# Patient Record
Sex: FEMALE | Race: WHITE | NOT HISPANIC OR LATINO | Employment: FULL TIME | URBAN - METROPOLITAN AREA
[De-identification: names, ages, dates, MRNs, and addresses within clinical notes are randomized per-mention and may not be internally consistent; named-entity substitution may affect disease eponyms.]

---

## 2019-03-15 ENCOUNTER — APPOINTMENT (OUTPATIENT)
Dept: RADIOLOGY | Facility: CLINIC | Age: 49
End: 2019-03-15
Payer: COMMERCIAL

## 2019-03-15 ENCOUNTER — OFFICE VISIT (OUTPATIENT)
Dept: URGENT CARE | Facility: CLINIC | Age: 49
End: 2019-03-15
Payer: COMMERCIAL

## 2019-03-15 VITALS
SYSTOLIC BLOOD PRESSURE: 124 MMHG | DIASTOLIC BLOOD PRESSURE: 67 MMHG | RESPIRATION RATE: 18 BRPM | BODY MASS INDEX: 28.99 KG/M2 | HEIGHT: 65 IN | HEART RATE: 89 BPM | WEIGHT: 174 LBS | OXYGEN SATURATION: 96 % | TEMPERATURE: 98.6 F

## 2019-03-15 DIAGNOSIS — M25.521 RIGHT ELBOW PAIN: Primary | ICD-10-CM

## 2019-03-15 DIAGNOSIS — M25.521 RIGHT ELBOW PAIN: ICD-10-CM

## 2019-03-15 PROCEDURE — 73080 X-RAY EXAM OF ELBOW: CPT

## 2019-03-15 PROCEDURE — 99203 OFFICE O/P NEW LOW 30 MIN: CPT | Performed by: PHYSICIAN ASSISTANT

## 2019-03-15 PROCEDURE — 99283 EMERGENCY DEPT VISIT LOW MDM: CPT | Performed by: PHYSICIAN ASSISTANT

## 2019-03-15 PROCEDURE — G0382 LEV 3 HOSP TYPE B ED VISIT: HCPCS | Performed by: PHYSICIAN ASSISTANT

## 2019-03-15 NOTE — PROGRESS NOTES
[facility] Now        NAME: Brittney Pizarro is a 50 y o  female  : 1970    MRN: [id number]  DATE: March 15, 2019  TIME: 4:52 PM    Assessment and Plan   Right elbow pain [M25 521]  1  Right elbow pain  XR elbow 3+ vw right    Ambulatory referral to Physical Therapy         Patient Instructions     No fracture on xray  Take ibuprofen 600 mg every 6 hours for inflammation  Take with food  Ice to the elbow for 20-30 minute 3-4 times daily  Follow up with physical therapy and/or orthopedics for persistent symptoms  Follow up with PCP in 3-5 days  Proceed to  ER if symptoms worsen  Chief Complaint     Chief Complaint   Patient presents with    Elbow Pain     PT complains of having right elbow pain x 1 mnth         History of Present Illness       This is a 50year old female presenting for right elbow pain x 1 month  She reports that she had 2 consecutive falls about a month ago and landed on her elbow both times  She had some intermittent pain but over the last month the pain has been gradually worsening and the pain in the elbow is now constant  It mostly radiates down toward the hand and sometimes to the shoulder  She denies any shooting pains down into the hand  She denies any numbness or tingling to the hand though she sometimes has weakness due to pain  No fevers, chills  She has been taking an aspirin every now and then but otherwise no treatments for this yet  She is right hand dominant  She does work with her hands and has been using the arm frequently  No previous history of problems with this arm  Review of Systems   Review of Systems   Constitutional: Negative for fever  Musculoskeletal: Positive for arthralgias  Negative for back pain, gait problem and joint swelling  Skin: Negative for wound  Neurological: Positive for weakness  Negative for numbness  Current Medications     No current outpatient medications on file      Current Allergies     Allergies as of 03/15/2019 - Reviewed 03/15/2019   Allergen Reaction Noted    Dilaudid [hydromorphone]  03/15/2019            The following portions of the patient's history were reviewed and updated as appropriate: allergies, current medications, past family history, past medical history, past social history, past surgical history and problem list      History reviewed  No pertinent past medical history  History reviewed  No pertinent surgical history  History reviewed  No pertinent family history  Medications have been verified  Objective   /67 (BP Location: Right arm, Patient Position: Sitting, Cuff Size: Standard)   Pulse 89   Temp 98 6 °F (37 °C) (Tympanic)   Resp 18   Ht 5' 5" (1 651 m)   Wt 78 9 kg (174 lb)   SpO2 96%   BMI 28 96 kg/m²        Physical Exam     Physical Exam   Constitutional: She appears well-developed and well-nourished  No distress  HENT:   Nose: Nose normal    Cardiovascular: Normal rate, regular rhythm and normal heart sounds  Pulmonary/Chest: Effort normal and breath sounds normal  No respiratory distress  Musculoskeletal:   RUE: There is no tenderness to the shoulder or humerus  There is TTP to the lateral epicondyle with mild tenderness to the surrounding area  Mild TTP to the forearm  No TTP to the wrist or hands  2+ radial and ulnar pulses bilaterally  Sensation intact throughout  FAROM of the elbow  5/5 strength BL UE and LE  Negative cubital tunnel test    Neurological: She is alert  Skin: Skin is warm and dry  She is not diaphoretic  Nursing note and vitals reviewed

## 2019-03-15 NOTE — PATIENT INSTRUCTIONS
No fracture on xray  Take ibuprofen 600 mg every 6 hours for inflammation  Take with food  Ice to the elbow for 20-30 minute 3-4 times daily  Follow up with physical therapy and/or orthopedics for persistent symptoms  Go to the ER for any distress

## 2020-09-05 ENCOUNTER — OFFICE VISIT (OUTPATIENT)
Dept: URGENT CARE | Facility: CLINIC | Age: 50
End: 2020-09-05
Payer: COMMERCIAL

## 2020-09-05 VITALS
HEIGHT: 65 IN | HEART RATE: 94 BPM | DIASTOLIC BLOOD PRESSURE: 88 MMHG | SYSTOLIC BLOOD PRESSURE: 140 MMHG | OXYGEN SATURATION: 98 % | WEIGHT: 170 LBS | RESPIRATION RATE: 18 BRPM | TEMPERATURE: 97.7 F | BODY MASS INDEX: 28.32 KG/M2

## 2020-09-05 DIAGNOSIS — M62.838 NECK MUSCLE SPASM: Primary | ICD-10-CM

## 2020-09-05 PROCEDURE — 99283 EMERGENCY DEPT VISIT LOW MDM: CPT | Performed by: PHYSICIAN ASSISTANT

## 2020-09-05 PROCEDURE — G0382 LEV 3 HOSP TYPE B ED VISIT: HCPCS | Performed by: PHYSICIAN ASSISTANT

## 2020-09-05 PROCEDURE — 99203 OFFICE O/P NEW LOW 30 MIN: CPT | Performed by: PHYSICIAN ASSISTANT

## 2020-09-05 RX ORDER — PREDNISONE 10 MG/1
TABLET ORAL
Qty: 18 TABLET | Refills: 0 | Status: SHIPPED | OUTPATIENT
Start: 2020-09-05

## 2020-09-05 RX ORDER — METHOCARBAMOL 500 MG/1
500 TABLET, FILM COATED ORAL 4 TIMES DAILY
Qty: 40 TABLET | Refills: 0 | Status: SHIPPED | OUTPATIENT
Start: 2020-09-05

## 2020-09-05 NOTE — PROGRESS NOTES
[facility] Now        NAME: Shola Crowley is a 48 y o  female  : 1970    MRN: [id number]  DATE: 2020  TIME: 9:51 AM    Assessment and Plan   Neck muscle spasm [M62 838]  1  Neck muscle spasm  predniSONE 10 mg tablet    methocarbamol (ROBAXIN) 500 mg tablet    Ambulatory referral to Physical Therapy         Patient Instructions     Patient Instructions     Continue moist heat  Will start muscle relaxers and prednisone  Follow-up with physical therapy  If worsening pain, numbness or tingling follow-up with PCP  Follow up with PCP in 3-5 days  Proceed to  ER if symptoms worsen  Chief Complaint     Chief Complaint   Patient presents with    Shoulder Pain     Pt c/o right shoulder pain that radiates up side of neck and down right arm x 1 week, no injury  History of Present Illness         55-year-old female presents to the clinic with right-sided neck pain and shoulder pain for last week  No fall or trauma  She has pain radiating down her neck to her upper arm  No pain in the hand or wrist   No numbness or tingling  She has used heat at home  No history of previous neck problems  Review of Systems   Review of Systems   Constitutional: Negative for chills, fatigue and fever  HENT: Negative for congestion, ear pain, postnasal drip, rhinorrhea, sinus pressure, sinus pain, sore throat and trouble swallowing  Eyes: Negative for visual disturbance  Respiratory: Negative for chest tightness and shortness of breath  Cardiovascular: Negative for chest pain and palpitations  Gastrointestinal: Negative for diarrhea, nausea and vomiting  Musculoskeletal: Positive for arthralgias and neck pain  Negative for joint swelling  Neurological: Negative for dizziness           Current Medications       Current Outpatient Medications:     methocarbamol (ROBAXIN) 500 mg tablet, Take 1 tablet (500 mg total) by mouth 4 (four) times a day, Disp: 40 tablet, Rfl: 0   predniSONE 10 mg tablet, 3 tabs daily for 3 days, 2 tabs daily for 3 days, 1 tab daily for 3 days, Disp: 18 tablet, Rfl: 0    Current Allergies     Allergies as of 09/05/2020 - Reviewed 09/05/2020   Allergen Reaction Noted    Dilaudid [hydromorphone]  03/15/2019            The following portions of the patient's history were reviewed and updated as appropriate: allergies, current medications, past family history, past medical history, past social history, past surgical history and problem list      History reviewed  No pertinent past medical history  Past Surgical History:   Procedure Laterality Date    GALLBLADDER SURGERY      GASTRIC BYPASS  2012       History reviewed  No pertinent family history  Medications have been verified  Objective   /88 (BP Location: Left arm, Patient Position: Sitting, Cuff Size: Standard)   Pulse 94   Temp 97 7 °F (36 5 °C) (Tympanic)   Resp 18   Ht 5' 5" (1 651 m)   Wt 77 1 kg (170 lb)   SpO2 98%   BMI 28 29 kg/m²        Physical Exam     Physical Exam  Constitutional:       General: She is not in acute distress  Appearance: She is well-developed  Musculoskeletal:      Comments:   Cervical spine nontender  Right trapezius and neck muscles mildly tender with spasm  Neck range of motion maintained with flexion  She has decreased range of motion with extension  Pain with extension  She has pain with left rotation  No pain with right rotation  Bilateral upper extremity strength 5/5  Right shoulder full range of motion no weakness  Negative drop arm  Negative supraspinatus testing  Negative impingement  She does have pain with internal rotation at 90° of abduction  This causes pain in the trapezius and the periscapular area  Mildly tender on the medial scapular border  Skin:     Comments:   Left posterior elbow with small 1 cm rubbery mobile nodule  Likely cystic structure in the skin  No overlying skin changes  No erythema    No pustule or drainage  Neurological:      Mental Status: She is alert and oriented to person, place, and time

## 2020-09-05 NOTE — PATIENT INSTRUCTIONS
Continue moist heat  Will start muscle relaxers and prednisone  Follow-up with physical therapy  If worsening pain, numbness or tingling follow-up with PCP

## 2022-10-12 ENCOUNTER — OFFICE VISIT (OUTPATIENT)
Dept: URGENT CARE | Facility: CLINIC | Age: 52
End: 2022-10-12
Payer: COMMERCIAL

## 2022-10-12 VITALS — TEMPERATURE: 97.2 F | OXYGEN SATURATION: 96 % | HEART RATE: 97 BPM | RESPIRATION RATE: 16 BRPM

## 2022-10-12 DIAGNOSIS — L03.012 PARONYCHIA OF FINGER OF LEFT HAND: Primary | ICD-10-CM

## 2022-10-12 PROCEDURE — 99213 OFFICE O/P EST LOW 20 MIN: CPT

## 2022-10-12 RX ORDER — SULFAMETHOXAZOLE AND TRIMETHOPRIM 800; 160 MG/1; MG/1
1 TABLET ORAL EVERY 12 HOURS SCHEDULED
Qty: 14 TABLET | Refills: 0 | Status: SHIPPED | OUTPATIENT
Start: 2022-10-12 | End: 2022-10-19

## 2022-10-12 NOTE — PROGRESS NOTES
[facility] Now        NAME: Gregoria Falk is a 46 y o  female  : 1970    MRN: [id number]  DATE: 2022  TIME: 4:17 PM    Assessment and Plan   Paronychia of finger of left hand [L03 012]  1  Paronychia of finger of left hand       Symptoms consistent with paronychia of left pinky finger  Will start on antibiotics  Educated on return precautions to ER/urgent care  Follow up with PCP in 3-5 days  Patient Instructions     Complete entire course of antibiotics even if feeling better  Return or go to ER if redness area is spreading, pain is worse, fevers/chills, dizziness and body aches  Follow up with PCP in 3-5 days  Proceed to  ER if symptoms worsen  Chief Complaint     Chief Complaint   Patient presents with   • Hand Pain     States pain to little finger on L hand  States she bites her nails and have frequent "finger infections" states she been soaking it and applying topical ointment  And pain is increasing  Denies chills and or fevers  History of Present Illness       Pain to the little finger on the left hand  She does bite her nails and has gotten a paronychia infection in the past  She has been soaking the area and using topical ointments without relief  She denies fevers or chills  Review of Systems   Review of Systems   Constitutional: Negative for chills, fatigue and fever  HENT: Negative for congestion  Respiratory: Negative for cough and shortness of breath  Cardiovascular: Negative for chest pain  Musculoskeletal: Negative for myalgias  Skin: Positive for color change and wound  Neurological: Negative for syncope  Psychiatric/Behavioral: Negative for confusion           Current Medications       Current Outpatient Medications:   •  methocarbamol (ROBAXIN) 500 mg tablet, Take 1 tablet (500 mg total) by mouth 4 (four) times a day (Patient not taking: Reported on 10/12/2022), Disp: 40 tablet, Rfl: 0  •  predniSONE 10 mg tablet, 3 tabs daily for 3 days, 2 tabs daily for 3 days, 1 tab daily for 3 days (Patient not taking: Reported on 10/12/2022), Disp: 18 tablet, Rfl: 0    Current Allergies     Allergies as of 10/12/2022 - Reviewed 10/12/2022   Allergen Reaction Noted   • Dilaudid [hydromorphone]  03/15/2019            The following portions of the patient's history were reviewed and updated as appropriate: allergies, current medications, past family history, past medical history, past social history, past surgical history and problem list      History reviewed  No pertinent past medical history  Past Surgical History:   Procedure Laterality Date   • GALLBLADDER SURGERY     • GASTRIC BYPASS  2012       History reviewed  No pertinent family history  Medications have been verified  Objective   Pulse 97   Temp (!) 97 2 °F (36 2 °C)   Resp 16   SpO2 96%        Physical Exam     Physical Exam  Vitals reviewed  Constitutional:       Appearance: Normal appearance  Cardiovascular:      Rate and Rhythm: Normal rate  Pulses: Normal pulses  Pulmonary:      Effort: Pulmonary effort is normal  No respiratory distress  Musculoskeletal:         General: Normal range of motion  Skin:     General: Skin is warm and dry  Capillary Refill: Capillary refill takes less than 2 seconds  Comments: Left little finger with erythema around the nail and area of fluctuance  Slight warmth to the site  Neurological:      General: No focal deficit present  Mental Status: She is alert and oriented to person, place, and time  Psychiatric:         Mood and Affect: Mood normal          Behavior: Behavior normal          Incision and drain    Date/Time: 10/12/2022 4:39 PM  Performed by: KEI Beltran  Authorized by: KEI Beltran   Universal Protocol:  Consent: Verbal consent obtained    Consent given by: patient  Timeout called at: 10/12/2022 4:40 PM   Patient understanding: patient states understanding of the procedure being performed  Patient identity confirmed: verbally with patient      Patient location:  Clinic  Location:     Indications for incision and drainage: paronychia  Location:  Upper extremity    Upper extremity location:  L small finger  Pre-procedure details:     Skin preparation:  Betadine  Procedure details:     Complexity:  Simple    Incision types:  Stab incision    Aspiration type: puncture aspiration      Scalpel blade:  11    Approach:  Open    Incision depth:  Subcutaneous  Post-procedure details:     Patient tolerance of procedure: Tolerated well, no immediate complications  Comments:      Very minimal sanguinous drainage removed from the site to the medial portion of the nail

## 2022-10-12 NOTE — PATIENT INSTRUCTIONS
Complete entire course of antibiotics even if feeling better  Return or go to ER if redness area is spreading, pain is worse, fevers/chills, dizziness and body aches  Follow up with PCP in 3-5 days  Proceed to  ER if symptoms worsen

## 2023-11-30 ENCOUNTER — OFFICE VISIT (OUTPATIENT)
Dept: URGENT CARE | Facility: CLINIC | Age: 53
End: 2023-11-30
Payer: COMMERCIAL

## 2023-11-30 VITALS
RESPIRATION RATE: 18 BRPM | SYSTOLIC BLOOD PRESSURE: 162 MMHG | DIASTOLIC BLOOD PRESSURE: 70 MMHG | TEMPERATURE: 98.5 F | OXYGEN SATURATION: 98 % | HEART RATE: 90 BPM

## 2023-11-30 DIAGNOSIS — L30.9 DERMATITIS: Primary | ICD-10-CM

## 2023-11-30 DIAGNOSIS — L03.011 CELLULITIS OF RIGHT MIDDLE FINGER: ICD-10-CM

## 2023-11-30 PROCEDURE — 99213 OFFICE O/P EST LOW 20 MIN: CPT | Performed by: PHYSICIAN ASSISTANT

## 2023-11-30 RX ORDER — CEPHALEXIN 500 MG/1
500 CAPSULE ORAL EVERY 6 HOURS SCHEDULED
Qty: 28 CAPSULE | Refills: 0 | Status: SHIPPED | OUTPATIENT
Start: 2023-11-30 | End: 2023-12-07

## 2023-12-01 NOTE — PROGRESS NOTES
[facility] Now        NAME: Leanne Girard is a 48 y.o. female  : 1970    MRN: [id number]  DATE: 2023  TIME: 7:27 PM    Assessment and Plan   Dermatitis [L30.9]  1. Dermatitis  Ambulatory Referral to Dermatology      2. Cellulitis of right middle finger  cephalexin (KEFLEX) 500 mg capsule            Patient Instructions       Follow up with PCP in 3-5 days. Proceed to  ER if symptoms worsen. Chief Complaint     Chief Complaint   Patient presents with   • Hand Pain     Right finger pain started today. States its tight and red. History of Present Illness       Patient presents with right middle finger pain, redness and swelling starting today. Denies fevers, muscle/body aches, or discharge. Also states she's had a dry and red rash over her bilateral shins for over a year and requesting dermatology referral.  States its just irritated and dry otherwise causes no problems. Hasn't tried anything for it. Review of Systems   Review of Systems   Constitutional:  Negative for fatigue and fever. Musculoskeletal:  Positive for joint swelling. Negative for myalgias. Skin:  Positive for rash. Neurological:  Negative for numbness.          Current Medications       Current Outpatient Medications:   •  cephalexin (KEFLEX) 500 mg capsule, Take 1 capsule (500 mg total) by mouth every 6 (six) hours for 7 days, Disp: 28 capsule, Rfl: 0  •  methocarbamol (ROBAXIN) 500 mg tablet, Take 1 tablet (500 mg total) by mouth 4 (four) times a day (Patient not taking: Reported on 10/12/2022), Disp: 40 tablet, Rfl: 0  •  predniSONE 10 mg tablet, 3 tabs daily for 3 days, 2 tabs daily for 3 days, 1 tab daily for 3 days (Patient not taking: Reported on 10/12/2022), Disp: 18 tablet, Rfl: 0    Current Allergies     Allergies as of 2023 - Reviewed 10/12/2022   Allergen Reaction Noted   • Dilaudid [hydromorphone] Vomiting 03/15/2019            The following portions of the patient's history were reviewed and updated as appropriate: allergies, current medications, past family history, past medical history, past social history, past surgical history and problem list.     History reviewed. No pertinent past medical history. Past Surgical History:   Procedure Laterality Date   • GALLBLADDER SURGERY     • GASTRIC BYPASS  2012       History reviewed. No pertinent family history. Medications have been verified. Objective   /70   Pulse 90   Temp 98.5 °F (36.9 °C)   Resp 18   SpO2 98%   No LMP recorded. Physical Exam     Physical Exam  Constitutional:       Appearance: Normal appearance. Cardiovascular:      Pulses: Normal pulses. Musculoskeletal:         General: Swelling present. Normal range of motion. Skin:     General: Skin is warm. Capillary Refill: Capillary refill takes less than 2 seconds. Findings: Rash present. Comments: Mildly erythematous and swollen right middle finger. Dry/roughened erythematous patches over the bilateral anterior shins. Various sizes ranging from 4 to 8 cm. No central clearing, raised borders, or satelite lesions. Neurological:      Mental Status: She is alert.    Psychiatric:         Mood and Affect: Mood normal.         Behavior: Behavior normal.

## 2023-12-01 NOTE — PATIENT INSTRUCTIONS
Take antibiotics as prescribed. Follow-up with your primary care provider in the next 3-5 days. Any new or worsening symptoms develop get re-evaluated sooner or proceed to the ER. Dermatology referral placed.

## 2024-02-23 ENCOUNTER — OFFICE VISIT (OUTPATIENT)
Dept: URGENT CARE | Facility: CLINIC | Age: 54
End: 2024-02-23
Payer: COMMERCIAL

## 2024-02-23 VITALS
WEIGHT: 170 LBS | RESPIRATION RATE: 18 BRPM | SYSTOLIC BLOOD PRESSURE: 159 MMHG | HEART RATE: 79 BPM | TEMPERATURE: 98.3 F | DIASTOLIC BLOOD PRESSURE: 74 MMHG | BODY MASS INDEX: 28.29 KG/M2 | OXYGEN SATURATION: 97 %

## 2024-02-23 DIAGNOSIS — K04.7 DENTAL ABSCESS: Primary | ICD-10-CM

## 2024-02-23 PROBLEM — N20.0 RENAL STONES: Status: ACTIVE | Noted: 2024-02-23

## 2024-02-23 PROCEDURE — 99213 OFFICE O/P EST LOW 20 MIN: CPT

## 2024-02-23 RX ORDER — AMOXICILLIN AND CLAVULANATE POTASSIUM 875; 125 MG/1; MG/1
1 TABLET, FILM COATED ORAL EVERY 12 HOURS SCHEDULED
Qty: 14 TABLET | Refills: 0 | Status: SHIPPED | OUTPATIENT
Start: 2024-02-23 | End: 2024-03-01

## 2024-02-23 RX ORDER — IBUPROFEN 800 MG/1
800 TABLET ORAL EVERY 8 HOURS PRN
Qty: 21 TABLET | Refills: 0 | Status: SHIPPED | OUTPATIENT
Start: 2024-02-23 | End: 2024-03-01

## 2024-02-24 NOTE — PATIENT INSTRUCTIONS
Take antibiotics and pain medications as directed for next week. Complete entire course of antibiotics even if feeling better and take medication with food to avoid upset stomach. Follow-up with dentist within 72 hours to prevent recurring infection. Report to ER if symptoms worsen.

## 2024-02-24 NOTE — PROGRESS NOTES
[facility] Now        NAME: Jenifer Stephens is a 53 y.o. female  : 1970    MRN: [id number]  DATE: 2024  TIME: 7:34 PM    Assessment and Plan   Dental abscess [K04.7]  1. Dental abscess  amoxicillin-clavulanate (AUGMENTIN) 875-125 mg per tablet    ibuprofen (MOTRIN) 800 mg tablet        Physical exam consistent with dental abscess, antibiotics and pain medication as directed. Information provided on dental clinic. VSS in clinic, appears in no acute distress. Educated on use of OTC products for additional relief of symptoms. Advised close follow-up with dentist or to report to the ER if symptoms worsen. Patient verbalizes understanding and agreeable to plan.       Patient Instructions     Take antibiotics and pain medications as directed for next week. Complete entire course of antibiotics even if feeling better and take medication with food to avoid upset stomach. Follow-up with dentist within 72 hours to prevent recurring infection. Report to ER if symptoms worsen.      Chief Complaint     Chief Complaint   Patient presents with    Dental Pain     Pt is here for tooth pain, right side since yesterday. States that she also has fever for the last 2 days. States that she used saltwater gargle and it did help a little.         History of Present Illness       53 year old female presents for evaluation of dental pain and fever (tmax 99F) that started yesterday. She reports a history of a cracked tooth on the side where she's having pain but she has not been able to establish care with a dentist. She relates having a fever yesterday but also feels like she's having a viral symptoms. She reports pain with eating/drinking but denies difficulty swallowing or shortness of breath. She has tried salt water gargles for symptoms with minimal improvement of symptoms.     Dental Pain   This is a recurrent problem. The current episode started yesterday. The problem occurs constantly. The problem has been  unchanged. The pain is at a severity of 8/10. The pain is moderate. Associated symptoms include difficulty swallowing, facial pain, a fever and thermal sensitivity. Pertinent negatives include no oral bleeding or sinus pressure. Treatments tried: Salt water gargles. The treatment provided mild relief.       Review of Systems   Review of Systems   Constitutional:  Positive for fever. Negative for activity change, appetite change, chills and fatigue.   HENT:  Positive for congestion, dental problem and rhinorrhea. Negative for postnasal drip, sinus pressure, sinus pain, sore throat and trouble swallowing.    Eyes:  Negative for visual disturbance.   Respiratory:  Positive for cough. Negative for chest tightness and shortness of breath.    Cardiovascular:  Negative for chest pain.   Gastrointestinal:  Negative for abdominal pain, constipation, diarrhea and vomiting.   Musculoskeletal:  Negative for arthralgias, back pain, myalgias and neck pain.   Skin:  Negative for color change and pallor.   Allergic/Immunologic: Negative for environmental allergies and food allergies.   Neurological:  Negative for dizziness, light-headedness and headaches.         Current Medications       Current Outpatient Medications:     amoxicillin-clavulanate (AUGMENTIN) 875-125 mg per tablet, Take 1 tablet by mouth every 12 (twelve) hours for 7 days, Disp: 14 tablet, Rfl: 0    ibuprofen (MOTRIN) 800 mg tablet, Take 1 tablet (800 mg total) by mouth every 8 (eight) hours as needed for mild pain for up to 7 days, Disp: 21 tablet, Rfl: 0    methocarbamol (ROBAXIN) 500 mg tablet, Take 1 tablet (500 mg total) by mouth 4 (four) times a day (Patient not taking: Reported on 10/12/2022), Disp: 40 tablet, Rfl: 0    predniSONE 10 mg tablet, 3 tabs daily for 3 days, 2 tabs daily for 3 days, 1 tab daily for 3 days (Patient not taking: Reported on 10/12/2022), Disp: 18 tablet, Rfl: 0    Current Allergies     Allergies as of 02/23/2024 - Reviewed 02/23/2024    Allergen Reaction Noted    Dilaudid [hydromorphone] Vomiting 03/15/2019            The following portions of the patient's history were reviewed and updated as appropriate: allergies, current medications, past family history, past medical history, past social history, past surgical history and problem list.     History reviewed. No pertinent past medical history.    Past Surgical History:   Procedure Laterality Date    GALLBLADDER SURGERY      GASTRIC BYPASS  2012       History reviewed. No pertinent family history.      Medications have been verified.        Objective   /74   Pulse 79   Temp 98.3 °F (36.8 °C)   Wt 77.1 kg (170 lb)   SpO2 97%   BMI 28.29 kg/m²        Physical Exam     Physical Exam  Vitals and nursing note reviewed.   Constitutional:       General: She is awake. She is not in acute distress.     Appearance: Normal appearance. She is well-developed and normal weight.   HENT:      Head: Normocephalic and atraumatic.      Right Ear: Hearing normal.      Left Ear: Hearing normal.      Nose: No congestion or rhinorrhea.      Mouth/Throat:      Lips: Pink.      Mouth: Mucous membranes are moist.      Dentition: Abnormal dentition. Does not have dentures. Dental tenderness, dental caries and dental abscesses present. No gingival swelling or gum lesions.      Pharynx: Oropharynx is clear. Uvula midline. No oropharyngeal exudate or posterior oropharyngeal erythema.        Comments: Multiple dental caries. Cracked tooth, right upper molar. Airway intact.   Eyes:      Conjunctiva/sclera: Conjunctivae normal.   Cardiovascular:      Rate and Rhythm: Normal rate and regular rhythm.      Pulses: Normal pulses.      Heart sounds: Normal heart sounds.   Pulmonary:      Effort: Pulmonary effort is normal.      Breath sounds: Normal breath sounds.   Musculoskeletal:      Cervical back: Full passive range of motion without pain, normal range of motion and neck supple.   Lymphadenopathy:      Cervical: No  cervical adenopathy.   Skin:     General: Skin is warm and dry.   Neurological:      General: No focal deficit present.      Mental Status: She is alert and oriented to person, place, and time.   Psychiatric:         Mood and Affect: Mood normal.         Behavior: Behavior is cooperative.         Thought Content: Thought content normal.         Judgment: Judgment normal.

## 2024-09-24 ENCOUNTER — HOSPITAL ENCOUNTER (INPATIENT)
Facility: HOSPITAL | Age: 54
LOS: 1 days | Discharge: HOME | DRG: 720 | End: 2024-09-26
Admitting: INTERNAL MEDICINE
Payer: COMMERCIAL

## 2024-09-24 ENCOUNTER — APPOINTMENT (EMERGENCY)
Dept: CT IMAGING | Facility: HOSPITAL | Age: 54
DRG: 720 | End: 2024-09-24
Payer: COMMERCIAL

## 2024-09-24 ENCOUNTER — APPOINTMENT (EMERGENCY)
Dept: RADIOLOGY | Facility: HOSPITAL | Age: 54
DRG: 720 | End: 2024-09-24
Payer: COMMERCIAL

## 2024-09-24 DIAGNOSIS — R09.02 HYPOXIA: ICD-10-CM

## 2024-09-24 DIAGNOSIS — J21.9 BRONCHIOLITIS: Primary | ICD-10-CM

## 2024-09-24 PROBLEM — A41.9 SEPSIS (HCC): Status: ACTIVE | Noted: 2024-09-24

## 2024-09-24 LAB
ANION GAP SERPL CALCULATED.3IONS-SCNC: 6 MMOL/L (ref 4–13)
BASOPHILS # BLD AUTO: 0.1 THOUSANDS/ΜL (ref 0–0.1)
BASOPHILS NFR BLD AUTO: 1 % (ref 0–1)
BUN SERPL-MCNC: 10 MG/DL (ref 5–25)
CALCIUM SERPL-MCNC: 9.6 MG/DL (ref 8.4–10.2)
CHLORIDE SERPL-SCNC: 102 MMOL/L (ref 96–108)
CO2 SERPL-SCNC: 31 MMOL/L (ref 21–32)
CREAT SERPL-MCNC: 0.55 MG/DL (ref 0.6–1.3)
EOSINOPHIL # BLD AUTO: 0.21 THOUSAND/ΜL (ref 0–0.61)
EOSINOPHIL NFR BLD AUTO: 1 % (ref 0–6)
ERYTHROCYTE [DISTWIDTH] IN BLOOD BY AUTOMATED COUNT: 14.6 % (ref 11.6–15.1)
FLUAV AG UPPER RESP QL IA.RAPID: NEGATIVE
FLUBV AG UPPER RESP QL IA.RAPID: NEGATIVE
GFR SERPL CREATININE-BSD FRML MDRD: 106 ML/MIN/1.73SQ M
GLUCOSE SERPL-MCNC: 107 MG/DL (ref 65–140)
HCT VFR BLD AUTO: 39.7 % (ref 34.8–46.1)
HGB BLD-MCNC: 12.5 G/DL (ref 11.5–15.4)
IMM GRANULOCYTES # BLD AUTO: 0.23 THOUSAND/UL (ref 0–0.2)
IMM GRANULOCYTES NFR BLD AUTO: 1 % (ref 0–2)
LYMPHOCYTES # BLD AUTO: 3.32 THOUSANDS/ΜL (ref 0.6–4.47)
LYMPHOCYTES NFR BLD AUTO: 19 % (ref 14–44)
MAGNESIUM SERPL-MCNC: 2.3 MG/DL (ref 1.9–2.7)
MCH RBC QN AUTO: 28.6 PG (ref 26.8–34.3)
MCHC RBC AUTO-ENTMCNC: 31.5 G/DL (ref 31.4–37.4)
MCV RBC AUTO: 91 FL (ref 82–98)
MONOCYTES # BLD AUTO: 1.13 THOUSAND/ΜL (ref 0.17–1.22)
MONOCYTES NFR BLD AUTO: 7 % (ref 4–12)
NEUTROPHILS # BLD AUTO: 12.25 THOUSANDS/ΜL (ref 1.85–7.62)
NEUTS SEG NFR BLD AUTO: 71 % (ref 43–75)
NRBC BLD AUTO-RTO: 0 /100 WBCS
PLATELET # BLD AUTO: 406 THOUSANDS/UL (ref 149–390)
PMV BLD AUTO: 8.9 FL (ref 8.9–12.7)
POTASSIUM SERPL-SCNC: 4.6 MMOL/L (ref 3.5–5.3)
PROCALCITONIN SERPL-MCNC: <0.05 NG/ML
RBC # BLD AUTO: 4.37 MILLION/UL (ref 3.81–5.12)
SARS-COV+SARS-COV-2 AG RESP QL IA.RAPID: NEGATIVE
SODIUM SERPL-SCNC: 139 MMOL/L (ref 135–147)
WBC # BLD AUTO: 17.24 THOUSAND/UL (ref 4.31–10.16)

## 2024-09-24 PROCEDURE — 94640 AIRWAY INHALATION TREATMENT: CPT

## 2024-09-24 PROCEDURE — 87811 SARS-COV-2 COVID19 W/OPTIC: CPT

## 2024-09-24 PROCEDURE — 96361 HYDRATE IV INFUSION ADD-ON: CPT

## 2024-09-24 PROCEDURE — 93005 ELECTROCARDIOGRAM TRACING: CPT

## 2024-09-24 PROCEDURE — 71046 X-RAY EXAM CHEST 2 VIEWS: CPT

## 2024-09-24 PROCEDURE — 96375 TX/PRO/DX INJ NEW DRUG ADDON: CPT

## 2024-09-24 PROCEDURE — 84145 PROCALCITONIN (PCT): CPT | Performed by: PHYSICIAN ASSISTANT

## 2024-09-24 PROCEDURE — 87804 INFLUENZA ASSAY W/OPTIC: CPT

## 2024-09-24 PROCEDURE — 85025 COMPLETE CBC W/AUTO DIFF WBC: CPT

## 2024-09-24 PROCEDURE — 36415 COLL VENOUS BLD VENIPUNCTURE: CPT

## 2024-09-24 PROCEDURE — 83735 ASSAY OF MAGNESIUM: CPT

## 2024-09-24 PROCEDURE — 96374 THER/PROPH/DIAG INJ IV PUSH: CPT

## 2024-09-24 PROCEDURE — 99285 EMERGENCY DEPT VISIT HI MDM: CPT

## 2024-09-24 PROCEDURE — 71275 CT ANGIOGRAPHY CHEST: CPT

## 2024-09-24 PROCEDURE — 99222 1ST HOSP IP/OBS MODERATE 55: CPT | Performed by: PHYSICIAN ASSISTANT

## 2024-09-24 PROCEDURE — 80048 BASIC METABOLIC PNL TOTAL CA: CPT

## 2024-09-24 RX ORDER — LEVALBUTEROL INHALATION SOLUTION 1.25 MG/3ML
1.25 SOLUTION RESPIRATORY (INHALATION)
Status: DISCONTINUED | OUTPATIENT
Start: 2024-09-25 | End: 2024-09-26 | Stop reason: HOSPADM

## 2024-09-24 RX ORDER — SODIUM CHLORIDE FOR INHALATION 0.9 %
3 VIAL, NEBULIZER (ML) INHALATION
Status: DISCONTINUED | OUTPATIENT
Start: 2024-09-25 | End: 2024-09-25

## 2024-09-24 RX ORDER — ACETAMINOPHEN 10 MG/ML
1000 INJECTION, SOLUTION INTRAVENOUS ONCE
Status: COMPLETED | OUTPATIENT
Start: 2024-09-24 | End: 2024-09-24

## 2024-09-24 RX ORDER — BENZONATATE 100 MG/1
100 CAPSULE ORAL 3 TIMES DAILY PRN
Status: DISCONTINUED | OUTPATIENT
Start: 2024-09-24 | End: 2024-09-26 | Stop reason: HOSPADM

## 2024-09-24 RX ORDER — SODIUM CHLORIDE 9 MG/ML
125 INJECTION, SOLUTION INTRAVENOUS CONTINUOUS
Status: DISCONTINUED | OUTPATIENT
Start: 2024-09-24 | End: 2024-09-25

## 2024-09-24 RX ORDER — GUAIFENESIN 600 MG/1
600 TABLET, EXTENDED RELEASE ORAL 2 TIMES DAILY
Status: DISCONTINUED | OUTPATIENT
Start: 2024-09-25 | End: 2024-09-24

## 2024-09-24 RX ORDER — ENOXAPARIN SODIUM 100 MG/ML
40 INJECTION SUBCUTANEOUS DAILY
Status: DISCONTINUED | OUTPATIENT
Start: 2024-09-25 | End: 2024-09-26 | Stop reason: HOSPADM

## 2024-09-24 RX ORDER — IPRATROPIUM BROMIDE AND ALBUTEROL SULFATE 2.5; .5 MG/3ML; MG/3ML
3 SOLUTION RESPIRATORY (INHALATION) ONCE
Status: COMPLETED | OUTPATIENT
Start: 2024-09-24 | End: 2024-09-24

## 2024-09-24 RX ORDER — GUAIFENESIN 600 MG/1
600 TABLET, EXTENDED RELEASE ORAL 2 TIMES DAILY
Status: DISCONTINUED | OUTPATIENT
Start: 2024-09-24 | End: 2024-09-26 | Stop reason: HOSPADM

## 2024-09-24 RX ORDER — KETOROLAC TROMETHAMINE 30 MG/ML
15 INJECTION, SOLUTION INTRAMUSCULAR; INTRAVENOUS ONCE
Status: COMPLETED | OUTPATIENT
Start: 2024-09-24 | End: 2024-09-24

## 2024-09-24 RX ORDER — ACETAMINOPHEN 325 MG/1
650 TABLET ORAL EVERY 6 HOURS PRN
Status: DISCONTINUED | OUTPATIENT
Start: 2024-09-24 | End: 2024-09-26 | Stop reason: HOSPADM

## 2024-09-24 RX ADMIN — SODIUM CHLORIDE 1000 ML: 0.9 INJECTION, SOLUTION INTRAVENOUS at 19:50

## 2024-09-24 RX ADMIN — KETOROLAC TROMETHAMINE 15 MG: 30 INJECTION, SOLUTION INTRAMUSCULAR at 19:53

## 2024-09-24 RX ADMIN — IOHEXOL 85 ML: 350 INJECTION, SOLUTION INTRAVENOUS at 21:08

## 2024-09-24 RX ADMIN — IPRATROPIUM BROMIDE AND ALBUTEROL SULFATE 3 ML: 2.5; .5 SOLUTION RESPIRATORY (INHALATION) at 19:51

## 2024-09-24 RX ADMIN — ACETAMINOPHEN 1000 MG: 1000 INJECTION, SOLUTION INTRAVENOUS at 21:16

## 2024-09-25 LAB
ANION GAP SERPL CALCULATED.3IONS-SCNC: 6 MMOL/L (ref 4–13)
B PARAP IS1001 DNA NPH QL NAA+NON-PROBE: NOT DETECTED
B PERT.PT PRMT NPH QL NAA+NON-PROBE: NOT DETECTED
BASOPHILS # BLD AUTO: 0.11 THOUSANDS/ΜL (ref 0–0.1)
BASOPHILS NFR BLD AUTO: 1 % (ref 0–1)
BUN SERPL-MCNC: 11 MG/DL (ref 5–25)
C PNEUM DNA NPH QL NAA+NON-PROBE: NOT DETECTED
CALCIUM SERPL-MCNC: 8.8 MG/DL (ref 8.4–10.2)
CHLORIDE SERPL-SCNC: 105 MMOL/L (ref 96–108)
CO2 SERPL-SCNC: 28 MMOL/L (ref 21–32)
CREAT SERPL-MCNC: 0.5 MG/DL (ref 0.6–1.3)
EOSINOPHIL # BLD AUTO: 0.21 THOUSAND/ΜL (ref 0–0.61)
EOSINOPHIL NFR BLD AUTO: 2 % (ref 0–6)
ERYTHROCYTE [DISTWIDTH] IN BLOOD BY AUTOMATED COUNT: 15.1 % (ref 11.6–15.1)
FLUAV RNA NPH QL NAA+NON-PROBE: NOT DETECTED
FLUAV RNA RESP QL NAA+PROBE: NEGATIVE
FLUBV RNA NPH QL NAA+NON-PROBE: NOT DETECTED
FLUBV RNA RESP QL NAA+PROBE: NEGATIVE
GFR SERPL CREATININE-BSD FRML MDRD: 110 ML/MIN/1.73SQ M
GLUCOSE P FAST SERPL-MCNC: 110 MG/DL (ref 65–99)
GLUCOSE SERPL-MCNC: 110 MG/DL (ref 65–140)
HADV DNA NPH QL NAA+NON-PROBE: NOT DETECTED
HCOV 229E RNA NPH QL NAA+NON-PROBE: NOT DETECTED
HCOV HKU1 RNA NPH QL NAA+NON-PROBE: NOT DETECTED
HCOV NL63 RNA NPH QL NAA+NON-PROBE: NOT DETECTED
HCOV OC43 RNA NPH QL NAA+NON-PROBE: NOT DETECTED
HCT VFR BLD AUTO: 35.4 % (ref 34.8–46.1)
HGB BLD-MCNC: 11.1 G/DL (ref 11.5–15.4)
HMPV RNA NPH QL NAA+NON-PROBE: NOT DETECTED
HPIV1 RNA NPH QL NAA+NON-PROBE: NOT DETECTED
HPIV2 RNA NPH QL NAA+NON-PROBE: NOT DETECTED
HPIV3 RNA NPH QL NAA+NON-PROBE: NOT DETECTED
HPIV4 RNA NPH QL NAA+NON-PROBE: NOT DETECTED
IMM GRANULOCYTES # BLD AUTO: 0.25 THOUSAND/UL (ref 0–0.2)
IMM GRANULOCYTES NFR BLD AUTO: 2 % (ref 0–2)
LACTATE SERPL-SCNC: 0.6 MMOL/L (ref 0.5–2)
LYMPHOCYTES # BLD AUTO: 2.87 THOUSANDS/ΜL (ref 0.6–4.47)
LYMPHOCYTES NFR BLD AUTO: 21 % (ref 14–44)
M PNEUMO DNA NPH QL NAA+NON-PROBE: NOT DETECTED
MCH RBC QN AUTO: 28.8 PG (ref 26.8–34.3)
MCHC RBC AUTO-ENTMCNC: 31.4 G/DL (ref 31.4–37.4)
MCV RBC AUTO: 92 FL (ref 82–98)
MONOCYTES # BLD AUTO: 0.86 THOUSAND/ΜL (ref 0.17–1.22)
MONOCYTES NFR BLD AUTO: 6 % (ref 4–12)
NEUTROPHILS # BLD AUTO: 9.26 THOUSANDS/ΜL (ref 1.85–7.62)
NEUTS SEG NFR BLD AUTO: 68 % (ref 43–75)
NRBC BLD AUTO-RTO: 0 /100 WBCS
PLATELET # BLD AUTO: 353 THOUSANDS/UL (ref 149–390)
PMV BLD AUTO: 9 FL (ref 8.9–12.7)
POTASSIUM SERPL-SCNC: 4.6 MMOL/L (ref 3.5–5.3)
PROCALCITONIN SERPL-MCNC: <0.05 NG/ML
RBC # BLD AUTO: 3.85 MILLION/UL (ref 3.81–5.12)
RSV RNA NPH QL NAA+NON-PROBE: NOT DETECTED
RSV RNA RESP QL NAA+PROBE: NEGATIVE
RV+EV RNA NPH QL NAA+NON-PROBE: DETECTED
SARS-COV-2 RNA NPH QL NAA+NON-PROBE: NOT DETECTED
SARS-COV-2 RNA RESP QL NAA+PROBE: NEGATIVE
SODIUM SERPL-SCNC: 139 MMOL/L (ref 135–147)
WBC # BLD AUTO: 13.56 THOUSAND/UL (ref 4.31–10.16)

## 2024-09-25 PROCEDURE — 85025 COMPLETE CBC W/AUTO DIFF WBC: CPT | Performed by: PHYSICIAN ASSISTANT

## 2024-09-25 PROCEDURE — 99232 SBSQ HOSP IP/OBS MODERATE 35: CPT | Performed by: INTERNAL MEDICINE

## 2024-09-25 PROCEDURE — 94640 AIRWAY INHALATION TREATMENT: CPT

## 2024-09-25 PROCEDURE — 87040 BLOOD CULTURE FOR BACTERIA: CPT | Performed by: PHYSICIAN ASSISTANT

## 2024-09-25 PROCEDURE — 80048 BASIC METABOLIC PNL TOTAL CA: CPT | Performed by: PHYSICIAN ASSISTANT

## 2024-09-25 PROCEDURE — 0202U NFCT DS 22 TRGT SARS-COV-2: CPT | Performed by: PHYSICIAN ASSISTANT

## 2024-09-25 PROCEDURE — 83605 ASSAY OF LACTIC ACID: CPT | Performed by: PHYSICIAN ASSISTANT

## 2024-09-25 PROCEDURE — 94760 N-INVAS EAR/PLS OXIMETRY 1: CPT

## 2024-09-25 PROCEDURE — 84145 PROCALCITONIN (PCT): CPT | Performed by: PHYSICIAN ASSISTANT

## 2024-09-25 PROCEDURE — 94664 DEMO&/EVAL PT USE INHALER: CPT

## 2024-09-25 PROCEDURE — 0241U HB NFCT DS VIR RESP RNA 4 TRGT: CPT | Performed by: PHYSICIAN ASSISTANT

## 2024-09-25 RX ORDER — SODIUM CHLORIDE 9 MG/ML
100 INJECTION, SOLUTION INTRAVENOUS CONTINUOUS
Status: DISPENSED | OUTPATIENT
Start: 2024-09-25 | End: 2024-09-26

## 2024-09-25 RX ORDER — ALPRAZOLAM 0.25 MG
0.25 TABLET ORAL 2 TIMES DAILY PRN
Status: DISPENSED | OUTPATIENT
Start: 2024-09-25 | End: 2024-09-26

## 2024-09-25 RX ORDER — ALBUTEROL SULFATE 90 UG/1
2 INHALANT RESPIRATORY (INHALATION) EVERY 6 HOURS PRN
Status: DISCONTINUED | OUTPATIENT
Start: 2024-09-25 | End: 2024-09-26 | Stop reason: HOSPADM

## 2024-09-25 RX ADMIN — SODIUM CHLORIDE 100 ML/HR: 0.9 INJECTION, SOLUTION INTRAVENOUS at 21:36

## 2024-09-25 RX ADMIN — GUAIFENESIN 600 MG: 600 TABLET, EXTENDED RELEASE ORAL at 08:39

## 2024-09-25 RX ADMIN — LEVALBUTEROL HYDROCHLORIDE 1.25 MG: 1.25 SOLUTION RESPIRATORY (INHALATION) at 13:15

## 2024-09-25 RX ADMIN — BENZONATATE 100 MG: 100 CAPSULE ORAL at 13:37

## 2024-09-25 RX ADMIN — ACETAMINOPHEN 650 MG: 325 TABLET, FILM COATED ORAL at 11:13

## 2024-09-25 RX ADMIN — SODIUM CHLORIDE 125 ML/HR: 0.9 INJECTION, SOLUTION INTRAVENOUS at 11:09

## 2024-09-25 RX ADMIN — GUAIFENESIN 600 MG: 600 TABLET, EXTENDED RELEASE ORAL at 16:14

## 2024-09-25 RX ADMIN — IPRATROPIUM BROMIDE 0.5 MG: 0.5 SOLUTION RESPIRATORY (INHALATION) at 19:53

## 2024-09-25 RX ADMIN — IPRATROPIUM BROMIDE 0.5 MG: 0.5 SOLUTION RESPIRATORY (INHALATION) at 13:15

## 2024-09-25 RX ADMIN — SODIUM CHLORIDE 100 ML/HR: 0.9 INJECTION, SOLUTION INTRAVENOUS at 14:04

## 2024-09-25 RX ADMIN — LEVALBUTEROL HYDROCHLORIDE 1.25 MG: 1.25 SOLUTION RESPIRATORY (INHALATION) at 07:07

## 2024-09-25 RX ADMIN — ENOXAPARIN SODIUM 40 MG: 40 INJECTION SUBCUTANEOUS at 08:40

## 2024-09-25 RX ADMIN — GUAIFENESIN 600 MG: 600 TABLET, EXTENDED RELEASE ORAL at 00:25

## 2024-09-25 RX ADMIN — SODIUM CHLORIDE 125 ML/HR: 0.9 INJECTION, SOLUTION INTRAVENOUS at 00:26

## 2024-09-25 RX ADMIN — LEVALBUTEROL HYDROCHLORIDE 1.25 MG: 1.25 SOLUTION RESPIRATORY (INHALATION) at 19:53

## 2024-09-25 RX ADMIN — AZITHROMYCIN MONOHYDRATE 500 MG: 500 INJECTION, POWDER, LYOPHILIZED, FOR SOLUTION INTRAVENOUS at 15:31

## 2024-09-25 RX ADMIN — ALPRAZOLAM 0.25 MG: 0.25 TABLET ORAL at 20:30

## 2024-09-25 RX ADMIN — IPRATROPIUM BROMIDE 0.5 MG: 0.5 SOLUTION RESPIRATORY (INHALATION) at 07:07

## 2024-09-25 NOTE — PLAN OF CARE
Problem: PAIN - ADULT  Goal: Verbalizes/displays adequate comfort level or baseline comfort level  Description: Interventions:  - Encourage patient to monitor pain and request assistance  - Assess pain using appropriate pain scale  - Administer analgesics based on type and severity of pain and evaluate response  - Implement non-pharmacological measures as appropriate and evaluate response  - Consider cultural and social influences on pain and pain management  - Notify physician/advanced practitioner if interventions unsuccessful or patient reports new pain  Outcome: Progressing     Problem: INFECTION - ADULT  Goal: Absence or prevention of progression during hospitalization  Description: INTERVENTIONS:  - Assess and monitor for signs and symptoms of infection  - Monitor lab/diagnostic results  - Monitor all insertion sites, i.e. indwelling lines, tubes, and drains  - Monitor endotracheal if appropriate and nasal secretions for changes in amount and color  - [place] appropriate cooling/warming therapies per order  - Administer medications as ordered  - Instruct and encourage patient and family to use good hand hygiene technique  - Identify and instruct in appropriate isolation precautions for identified infection/condition  Outcome: Progressing     Problem: SAFETY ADULT  Goal: Patient will remain free of falls  Description: INTERVENTIONS:  - Educate patient/family on patient safety including physical limitations  - Instruct patient to call for assistance with activity   - Consult OT/PT to assist with strengthening/mobility   - Keep Call bell within reach  - Keep bed low and locked with side rails adjusted as appropriate  - Keep care items and personal belongings within reach  - Initiate and maintain comfort rounds  - Make Fall Risk Sign visible to staff  - Offer Toileting every 2 Hours, in advance of need  - Initiate/Maintain bed alarm  - Obtain necessary fall risk management equipment  - Apply yellow socks and  bracelet for high fall risk patients  - Consider moving patient to room near nurses station  Outcome: Progressing

## 2024-09-25 NOTE — ED PROVIDER NOTES
1. Bronchiolitis    2. Hypoxia      ED Disposition       ED Disposition   Admit    Condition   Stable    Date/Time   Tue Sep 24, 2024 10:40 PM    Comment   Admit to [facility]               Assessment & Plan       Medical Decision Making  Amount and/or Complexity of Data Reviewed  Labs: ordered. Decision-making details documented in ED Course.  Radiology: ordered and independent interpretation performed.    Risk  Prescription drug management.  Decision regarding hospitalization.      Patient is a 54-year-old female presenting to the ED for evaluation of 2 to 3-week history of persistent coughing, nasal congestion, now with increasing shortness of breath and dyspnea with exertion.  No chest pain.  History and clinical exam documented below. Presentation concerning for viral syndrome, pneumonia, bronchiolitis, metabolic derangement, arrhythmia.  No suspicion for ACS as patient is without chest pain and her chest discomfort is usually associated with coughing fits.  On my assessment, patient appears slightly breathless, with oxygen saturation of 93%.  Placed on 2 L for comfort, and improvement of shortness of breath noted.  Reviewed all diagnostics with the patient.  Testing is not indicative of pneumonia, or PE.  Patient was ambulated around the department, and pulse ox dropped from 97% to 93% and patient appeared to be more dyspneic with exertion.  No evidence of heart failure or hypervolemia.  Given patient's new oxygen requirement, as well as ongoing viral type symptoms, decision made to admit the patient for further evaluation and therapy.  She also has a 17,000 white count, which at this point could be related to underlying viral illness, versus stress response.  Patient is agreeable with the plan for admission.  Case was discussed with Darren; arrangements made for OBS.          ED Course as of 09/24/24 2342   Tue Sep 24, 2024   1952 EKG: NSR at 79 bpm, normal axis, normal intervals, no acute ischemic changes as  interpreted by me     2000 WBC(!): 17.24  Perhaps related to acute infection     2000 EKG: NSR at 79 bpm, normal axis, normal intervals, no acute ischemic changes as interpreted by me      2011 SARS COV Rapid Antigen: Negative   2011 Influenza A Rapid Antigen: Negative   2011 Influenza B Rapid Antigen: Negative   2049 On reevaluation, patient feeling better on 2 L of oxygen via nasal therapy.  Wet cough still noted.  Reviewed chest x-ray and there is no evidence of acute pneumonia or consolidations.  Given patient's new requirement for oxygen, as well as persistent wet cough, will order a CT scan of the chest to rule out infectious process, and also PE given new oxygen requirement and breathlessness.   2145 CTA CHEST:  IMPRESSION:     No evidence of pulmonary emboli     Tree-in-bud opacities throughout the right greater then left lung compatible with an infective bronchiolitis     Layering secretions within the distal trachea and bilateral mainstem bronchi         Medications   ipratropium-albuterol (DUO-NEB) 0.5-2.5 mg/3 mL inhalation solution 3 mL (3 mL Nebulization Given 9/24/24 1951)   sodium chloride 0.9 % bolus 1,000 mL (0 mL Intravenous Stopped 9/24/24 2050)   ketorolac (TORADOL) injection 15 mg (15 mg Intravenous Given 9/24/24 1953)   acetaminophen (Ofirmev) injection 1,000 mg (0 mg Intravenous Stopped 9/24/24 2131)   iohexol (OMNIPAQUE) 350 MG/ML injection (MULTI-DOSE) 85 mL (85 mL Intravenous Given 9/24/24 2108)       History of Present Illness       HPI    Patient is a 54-year-old female with past medical history of bipolar disorder, hyperlipidemia, esophageal reflux, iron deficiency anemia, depressive disorder, presenting to the ED for evaluation of near 2-week history of viral type symptoms consisting of nasal congestion, wet, productive cough, increasing shortness of breath. Patient experiencing chest soreness with coughing. She endorses chills but no documented fevers. Denies abdominal pain, nausea,  vomiting, peripheral edema.  States that she recently quit smoking.    Review of Systems   All other systems reviewed and are negative.          Objective     ED Triage Vitals   Temperature Pulse Blood Pressure Respirations SpO2 Patient Position - Orthostatic VS   09/24/24 1855 09/24/24 1855 09/24/24 1855 09/24/24 1855 09/24/24 1855 09/24/24 1855   99.8 °F (37.7 °C) 98 113/64 20 96 % Sitting      Temp Source Heart Rate Source BP Location FiO2 (%) Pain Score    09/24/24 1855 09/24/24 1855 09/24/24 1855 -- 09/24/24 1953    Oral Monitor Left arm  8        Physical Exam  Vitals and nursing note reviewed.   Constitutional:       General: She is not in acute distress.     Appearance: She is well-developed and normal weight. She is not toxic-appearing or diaphoretic.   HENT:      Head: Normocephalic and atraumatic.      Mouth/Throat:      Mouth: Mucous membranes are moist.      Pharynx: No pharyngeal swelling or oropharyngeal exudate.   Eyes:      Extraocular Movements: Extraocular movements intact.      Conjunctiva/sclera: Conjunctivae normal.   Neck:      Vascular: No JVD.   Cardiovascular:      Rate and Rhythm: Normal rate and regular rhythm.      Pulses: Normal pulses.      Heart sounds: Normal heart sounds. No murmur heard.  Pulmonary:      Effort: Pulmonary effort is normal. Tachypnea present.      Breath sounds: Decreased breath sounds present. No wheezing, rhonchi or rales.   Chest:      Chest wall: No mass, tenderness or edema.   Abdominal:      Palpations: Abdomen is soft.      Tenderness: There is no abdominal tenderness.   Musculoskeletal:         General: No swelling. Normal range of motion.      Cervical back: Normal range of motion and neck supple.      Right lower leg: No tenderness. No edema.      Left lower leg: No tenderness. No edema.   Skin:     General: Skin is warm and dry.      Capillary Refill: Capillary refill takes less than 2 seconds.      Findings: No erythema.   Neurological:      General: No  focal deficit present.      Mental Status: She is alert and oriented to person, place, and time.   Psychiatric:         Mood and Affect: Mood normal.         Labs Reviewed   CBC AND DIFFERENTIAL - Abnormal       Result Value    WBC 17.24 (*)     RBC 4.37      Hemoglobin 12.5      Hematocrit 39.7      MCV 91      MCH 28.6      MCHC 31.5      RDW 14.6      MPV 8.9      Platelets 406 (*)     nRBC 0      Segmented % 71      Immature Grans % 1      Lymphocytes % 19      Monocytes % 7      Eosinophils Relative 1      Basophils Relative 1      Absolute Neutrophils 12.25 (*)     Absolute Immature Grans 0.23 (*)     Absolute Lymphocytes 3.32      Absolute Monocytes 1.13      Eosinophils Absolute 0.21      Basophils Absolute 0.10     BASIC METABOLIC PANEL - Abnormal    Sodium 139      Potassium 4.6      Chloride 102      CO2 31      ANION GAP 6      BUN 10      Creatinine 0.55 (*)     Glucose 107      Calcium 9.6      eGFR 106      Narrative:     National Kidney Disease Foundation guidelines for Chronic Kidney Disease (CKD):     Stage 1 with normal or high GFR (GFR > 90 mL/min/1.73 square meters)    Stage 2 Mild CKD (GFR = 60-89 mL/min/1.73 square meters)    Stage 3A Moderate CKD (GFR = 45-59 mL/min/1.73 square meters)    Stage 3B Moderate CKD (GFR = 30-44 mL/min/1.73 square meters)    Stage 4 Severe CKD (GFR = 15-29 mL/min/1.73 square meters)    Stage 5 End Stage CKD (GFR <15 mL/min/1.73 square meters)  Note: GFR calculation is accurate only with a steady state creatinine   COVID-19/INFLUENZA A/B RAPID ANTIGEN (30 MIN.TAT) - Normal    SARS COV Rapid Antigen Negative      Influenza A Rapid Antigen Negative      Influenza B Rapid Antigen Negative      Narrative:     This test has been performed using the [employer] [place] Sury 2 FLU+SARS Antigen test under the Emergency Use Authorization (EUA). This test has been validated by the  and verified by the performing laboratory. The Sury uses lateral flow immunofluorescent  sandwich assay to detect SARS-COV, Influenza A and Influenza B Antigen.     The [employer] Sury 2 SARS Antigen test does not differentiate between SARS-CoV and SARS-CoV-2.     Negative results are presumptive and may be confirmed with a molecular assay, if necessary, for patient management. Negative results do not rule out SARS-CoV-2 or influenza infection and should not be used as the sole basis for treatment or patient management decisions. A negative test result may occur if the level of antigen in a sample is below the limit of detection of this test.     Positive results are indicative of the presence of viral antigens, but do not rule out bacterial infection or co-infection with other viruses.     All test results should be used as an adjunct to clinical observations and other information available to the provider.    FOR PEDIATRIC PATIENTS - copy/paste COVID Guidelines URL to browser: [email or web address]   MAGNESIUM - Normal    Magnesium 2.3     PROCALCITONIN TEST - Normal    Procalcitonin <0.05     BLOOD CULTURE   BLOOD CULTURE   COVID19, INFLUENZA A/B, RSV PCR, [facility]   LACTIC ACID, PLASMA (W/REFLEX IF RESULT > 2.0)     CTA ED chest PE study   Final Interpretation by Vicente Edwards MD (09/24 2128)      No evidence of pulmonary emboli      Tree-in-bud opacities throughout the right greater then left lung compatible with an infective bronchiolitis      Layering secretions within the distal trachea and bilateral mainstem bronchi            Workstation performed: [id number]         XR chest 2 views   ED Interpretation by Marquez Wilson DO (09/24 2050)   No acute cardiopulmonary disease as interpreted by me        Final Interpretation by Tres Howard MD (09/24 2140)      No acute cardiopulmonary disease.            Workstation performed: [id number]             Procedures    ED Medication and Procedure Management   Prior to Admission Medications    Prescriptions Last Dose Informant Patient Reported? Taking?   ibuprofen (MOTRIN) 800 mg tablet   No No   Sig: Take 1 tablet (800 mg total) by mouth every 8 (eight) hours as needed for mild pain for up to 7 days   methocarbamol (ROBAXIN) 500 mg tablet   No No   Sig: Take 1 tablet (500 mg total) by mouth 4 (four) times a day   Patient not taking: Reported on 10/12/2022   predniSONE 10 mg tablet   No No   Sig: 3 tabs daily for 3 days, 2 tabs daily for 3 days, 1 tab daily for 3 days   Patient not taking: Reported on 10/12/2022      Facility-Administered Medications: None     Current Discharge Medication List        CONTINUE these medications which have NOT CHANGED    Details   ibuprofen (MOTRIN) 800 mg tablet Take 1 tablet (800 mg total) by mouth every 8 (eight) hours as needed for mild pain for up to 7 days  Qty: 21 tablet, Refills: 0    Associated Diagnoses: Dental abscess      methocarbamol (ROBAXIN) 500 mg tablet Take 1 tablet (500 mg total) by mouth 4 (four) times a day  Qty: 40 tablet, Refills: 0    Associated Diagnoses: Neck muscle spasm      predniSONE 10 mg tablet 3 tabs daily for 3 days, 2 tabs daily for 3 days, 1 tab daily for 3 days  Qty: 18 tablet, Refills: 0    Associated Diagnoses: Neck muscle spasm           No discharge procedures on file.     Marquez Wilson, DO  09/24/24 [id number]       Marquez Wilson, DO  09/24/24 7904

## 2024-09-25 NOTE — ASSESSMENT & PLAN NOTE
Patient presents with 2 weeks of cough, chills, shortness of breath  Noted to have leukocytosis 18K, tachypnea, mild tachycardia in the ER  CTA chest: Tree-in-bud opacities throughout the right greater then left lung compatible with an infective bronchiolitis. Layering secretions within the distal trachea and bilateral mainstem bronchi.   Suspect likely viral etiology in the setting of bronchiolitis  Obtain blood cultures, lactic acid, procalcitonin   IVF  Monitor off abx -- consider initiation of Azithromycin if procalcitonin elevated

## 2024-09-25 NOTE — RESPIRATORY THERAPY NOTE
RT Protocol Note  Jenifer Stephens 54 y.o. female MRN: [id number]  Unit/Bed#: -01 Encounter: [id number]    Assessment    Principal Problem:    Sepsis (HCC)  Active Problems:    Tobacco use disorder    Bronchiolitis      Home Pulmonary Medications:     09/24/24 2353   Respiratory Protocol   Protocol Initiated? Yes   Protocol Selection Respiratory   Language Barrier? No   Medical & Social History Reviewed? Yes   Diagnostic Studies Reviewed? Yes   Physical Assessment Performed? Yes   Respiratory Plan No distress/Pulmonary history   Respiratory Assessment   Assessment Type Assess only   General Appearance Drowsy;Awake   Respiratory Pattern Dyspnea with exertion   Chest Assessment Chest expansion symmetrical   Bilateral Breath Sounds Rhonchi;Diminished   Cough Non-productive;Weak   Resp Comments Pt uses inhalers at home as needed.. No neb usage or home O2. No hx of COPD or asthma. No cpap use. Will order prn nebs and   O2 Device NC   Additional Assessments   Pulse 65   Respirations 20   SpO2 97 %            History reviewed. No pertinent past medical history.  Social History     Socioeconomic History    Marital status: Single     Spouse name: None    Number of children: None    Years of education: None    Highest education level: None   Occupational History    None   Tobacco Use    Smoking status: Some Days     Current packs/day: 0.50     Types: Cigarettes    Smokeless tobacco: Never   Vaping Use    Vaping status: Never Used   Substance and Sexual Activity    Alcohol use: Not Currently    Drug use: Never    Sexual activity: Yes   Other Topics Concern    None   Social History Narrative    None     Social Determinants of Health     Financial Resource Strain: Not on file   Food Insecurity: Not on file   Transportation Needs: Not on file   Physical Activity: Not on file   Stress: Not on file   Social Connections: Unknown (6/18/2024)    Received from [facility]    Social [employer]     How often do you feel lonely or  isolated from those around you? (Adult - for ages 18 years and over): Not on file   Intimate Partner Violence: Not on file   Housing Stability: Not on file       Subjective         Objective    Physical Exam:   Assessment Type: Assess only  General Appearance: Drowsy, Awake  Respiratory Pattern: Dyspnea with exertion  Chest Assessment: Chest expansion symmetrical  Bilateral Breath Sounds: Rhonchi, Diminished  Cough: Non-productive, Weak  O2 Device: NC    Vitals:  Blood pressure 99/54, pulse 65, temperature 98.2 °F (36.8 °C), resp. rate 20, SpO2 97%.          Imaging and other studies: No pertinent imaging studies reviewed.    O2 Device: NC     Plan    Respiratory Plan: No distress/Pulmonary history        Resp Comments: Pt uses inhalers at home as needed.. No neb usage or home O2. No hx of COPD or asthma. No cpap use. Will order prn nebs and

## 2024-09-25 NOTE — ASSESSMENT & PLAN NOTE
Patient presents with 2 weeks of cough, chills, shortness of breath  Noted to have leukocytosis 18K, tachypnea, mild tachycardia in the ER at admission  CTA chest: Tree-in-bud opacities throughout the right greater then left lung compatible with an infective bronchiolitis. Layering secretions within the distal trachea and bilateral mainstem bronchi.   Suspect likely viral etiology in the setting of bronchiolitis  Obtain blood cultures, lactic acid, procalcitonin   IVF to continue  Azithromycin started on 9/25/2024  Results from last 7 days   Lab Units 09/25/24  0535 09/25/24  0033   BLOOD CULTURE  Received in Microbiology Lab. Culture in Progress.  Received in Microbiology Lab. Culture in Progress.  --    INFLUENZA A PCR   --  Negative

## 2024-09-25 NOTE — H&P
H&P - Hospitalist   Name: Jenifer Stephens 54 y.o. female I MRN: [id number]  Unit/Bed#: -01 I Date of Admission: 9/24/2024   Date of Service: 9/24/2024 I Hospital Day: 0     Assessment & Plan  Sepsis (HCC)  Patient presents with 2 weeks of cough, chills, shortness of breath  Noted to have leukocytosis 18K, tachypnea, mild tachycardia in the ER  CTA chest: Tree-in-bud opacities throughout the right greater then left lung compatible with an infective bronchiolitis. Layering secretions within the distal trachea and bilateral mainstem bronchi.   Suspect likely viral etiology in the setting of bronchiolitis  Obtain blood cultures, lactic acid, procalcitonin   IVF  Monitor off abx -- consider initiation of Azithromycin if procalcitonin elevated   Bronchiolitis  Bronchiolitis noted on CTA chest   COVID and flu negative, not tested for RSV in the ED   Patient smokes 1 PPD for about 40 years  Check COVID/flu/RSV swab -- if negative for RSV will order RP2 swab   Xopenex/Atrovent TID   Mucinex, PRN Tessalon perles   Respiratory protocol   Patient currently placed on 2L NC for comfort, no documented hypoxia in the ER   Tobacco use disorder  Smokes 1 PPD -- has not smoked in 1 week due to feeling ill   Nicotine patch declined  Encourage cessation     VTE Pharmacologic Prophylaxis: VTE Score: 3 Moderate Risk (Score 3-4) - Pharmacological DVT Prophylaxis Ordered: enoxaparin (Lovenox).  Code Status: Level 1 - Full Code   Discussion with family: Patient declined call to .     Anticipated Length of Stay: Patient will be admitted on an observation basis with an anticipated length of stay of less than 2 midnights secondary to SOB, viral sepsis 2/2 bronchiolitis .    History of Present Illness   Chief Complaint: sob, cough, wheezing, chills x 2 weeks     Jenifer Stephens is a 54 y.o. female with a PMH of gastric bypass surgery who presents with SOB, productive cough, wheezing, chills x 2 weeks. She states her boyfriend  Patient complaining of right shoulder pain. Per patient pain is radiating through her shoulder up to her neck. Per patient pain started around Tuesday and has progressively been getting worst. Complains of dialysis catheter pain located on the right upper chest. Rates neck pain 10/10. No pain while pressing on right shoulder or when moving right arm. Provided patient with blanket. Bed placed in lowest position. has been sick with the same symptoms and still has a cough for the past few weeks as well. She reports subjective fevers at home. She smokes about 1 PPD for the past 40 years but has not smoked at all for 1 week due to feeling sick. States that she has just been stuck in bed for the past week and very low energy. She denies having symptoms like this in the past. Denies history of asthma/COPD.     Review of Systems   Constitutional:  Positive for chills, fatigue and fever.   HENT:  Positive for congestion.    Respiratory:  Positive for cough, shortness of breath and wheezing.    Cardiovascular:  Negative for chest pain, palpitations and leg swelling.   Gastrointestinal:  Negative for abdominal pain, diarrhea, nausea and vomiting.   Neurological:  Negative for weakness.   Psychiatric/Behavioral:  Negative for confusion.         I have reviewed the patient's PMH, PSH, Social History, Family History, Meds, and Allergies  Social History:  Marital Status: Single   Patient Pre-hospital Living Situation: Home  Patient Pre-hospital Level of Mobility: walks  Patient Pre-hospital Diet Restrictions: none    Objective     Vitals:   Blood Pressure: 99/54 (09/24/24 2311)  Pulse: 64 (09/24/24 2311)  Temperature: 98.2 °F (36.8 °C) (09/24/24 2311)  Temp Source: Oral (09/24/24 1855)  Respirations: 22 (09/24/24 2311)  SpO2: 95 % (09/24/24 2311)    Physical Exam  HENT:      Mouth/Throat:      Mouth: Mucous membranes are dry.   Eyes:      General: No scleral icterus.  Cardiovascular:      Rate and Rhythm: Normal rate and regular rhythm.      Heart sounds: Normal heart sounds.   Pulmonary:      Effort: No respiratory distress.      Breath sounds: Rhonchi present. No wheezing or rales.   Abdominal:      General: Abdomen is flat. Bowel sounds are normal.      Palpations: Abdomen is soft.      Tenderness: There is no abdominal tenderness.   Musculoskeletal:      Right lower leg: No edema.      Left lower leg: No edema.   Skin:     General:  "Skin is warm and dry.   Neurological:      General: No focal deficit present.      Mental Status: She is alert and oriented to person, place, and time.   Psychiatric:         Mood and Affect: Mood normal.          Lines/Drains:  Lines/Drains/Airways       Active Status       None                        Additional Data:     Results from last 7 days   Lab Units 09/24/24 1942   WBC Thousand/uL 17.24*   HEMOGLOBIN g/dL 12.5   HEMATOCRIT % 39.7   PLATELETS Thousands/uL 406*   SEGS PCT % 71   LYMPHO PCT % 19   MONO PCT % 7   EOS PCT % 1     Results from last 7 days   Lab Units 09/24/24 1942   SODIUM mmol/L 139   POTASSIUM mmol/L 4.6   CHLORIDE mmol/L 102   CO2 mmol/L 31   BUN mg/dL 10   CREATININE mg/dL 0.55*   ANION GAP mmol/L 6   CALCIUM mg/dL 9.6   GLUCOSE RANDOM mg/dL 107             No results found for: \"HGBA1C\"  Results from last 7 days   Lab Units 09/24/24 1942   PROCALCITONIN ng/ml <0.05       Imaging Review: Reviewed radiology reports from this admission including: chest xray and CT chest.  Other Studies: EKG was personally reviewed and my interpretation is: NSR. HR 89..    Administrative Statements   I have spent a total time of 40 minutes in caring for this patient on the day of the visit/encounter including Diagnostic results, Prognosis, Risks and benefits of tx options, Instructions for management, Patient and family education, Importance of tx compliance, Risk factor reductions, Impressions, Counseling / Coordination of care, Documenting in the medical record, Reviewing / ordering tests, medicine, procedures  , Obtaining or reviewing history  , and Communicating with other healthcare professionals .    ** Please Note: This note has been constructed using a voice recognition system. **    "

## 2024-09-25 NOTE — UTILIZATION REVIEW
Initial Clinical Review      OBS order 9/24 2240 converted to IP on 9/25 1629 for continued IV abx for sepsis     Admission: Date/Time/Statement:   Admission Orders (From admission, onward)       Ordered        09/25/24 1629  INPATIENT ADMISSION  Once            09/24/24 2240  Place in Observation  Once                           INPATIENT ADMISSION     Standing Status:   Standing     Number of Occurrences:   1     Order Specific Question:   Level of Care     Answer:   Med Surg [16]     Order Specific Question:   Estimated length of stay     Answer:   More than 2 Midnights     Order Specific Question:   Certification     Answer:   I certify that inpatient services are medically necessary for this patient for a duration of greater than two midnights. See H&P and MD Progress Notes for additional information about the patient's course of treatment.     ED Arrival Information       Expected   -    Arrival   9/24/2024 18:36    Acuity   Urgent              Means of arrival   Walk-In    Escorted by   Family Member    Service   Hospitalist    Admission type   Emergency              Arrival complaint   Shortness of breath             Chief Complaint   Patient presents with    Shortness of Breath     SOB/cough/fever past few days       Initial Presentation: 54 y.o. female to ED from home w/  PMH of gastric bypass surgery who presents with SOB, productive cough, wheezing, chills x 2 weeks.  cont w/ cough for the past few weeks . Subjective fevers at home . Has been in bed x1 week and very low energy . Admitted IP status w/ sepsis r/t bronchiolitis noted on CT chest . Plan to obtain BC , lactic acid , pct , IVF , monitor abx . Check COVID /flu /RSV , xoponex/atrovent , mucinex , tessalon perles . On 2l NC for comfort .     Anticipated Length of Stay/Certification Statement: Patient will be admitted on an observation basis with an anticipated length of stay of less than 2 midnights secondary to SOB, viral sepsis 2/2 bronchiolitis  .     9/25 IM note   Cont to have SOB at rest and inc w/ very minimal exertion . Mild cough, NP  . Generalized weakness .   Azithromycin started . On 2l O2 sat 91-97 % .  Cont nebs and inhalers .         ED Treatment-Medication Administration from 09/24/2024 1836 to 09/24/2024 2308         Date/Time Order Dose Route Action     09/24/2024 1951 ipratropium-albuterol (DUO-NEB) 0.5-2.5 mg/3 mL inhalation solution 3 mL 3 mL Nebulization Given     09/24/2024 1950 sodium chloride 0.9 % bolus 1,000 mL 1,000 mL Intravenous New Bag     09/24/2024 1953 ketorolac (TORADOL) injection 15 mg 15 mg Intravenous Given     09/24/2024 2116 acetaminophen (Ofirmev) injection 1,000 mg 1,000 mg Intravenous New Bag     09/24/2024 2108 iohexol (OMNIPAQUE) 350 MG/ML injection (MULTI-DOSE) 85 mL 85 mL Intravenous Given            Scheduled Medications:  azithromycin, 500 mg, Intravenous, Q24H  enoxaparin, 40 mg, Subcutaneous, Daily  guaiFENesin, 600 mg, Oral, BID  ipratropium, 0.5 mg, Nebulization, TID  levalbuterol, 1.25 mg, Nebulization, TID      Continuous IV Infusions:  sodium chloride, 100 mL/hr, Intravenous, Continuous      PRN Meds:  acetaminophen, 650 mg, Oral, Q6H PRN  albuterol, 2 puff, Inhalation, Q6H PRN  benzonatate, 100 mg, Oral, TID PRN      ED Triage Vitals   Temperature Pulse Respirations Blood Pressure SpO2 Pain Score   09/24/24 1855 09/24/24 1855 09/24/24 1855 09/24/24 1855 09/24/24 1855 09/24/24 1953   99.8 °F (37.7 °C) 98 20 113/64 96 % 8     Weight (last 2 days)       Date/Time Weight    09/25/24 0639 73.4 (161.82)    09/24/24 2353 77.1 (170)            Vital Signs (last 3 days)       Date/Time Temp Pulse Resp BP MAP (mmHg) SpO2 Calculated FIO2 (%) - Nasal Cannula Nasal Cannula O2 Flow Rate (L/min) O2 Device Patient Position - Orthostatic VS Pain    09/26/24 07:19:40 97.6 °F (36.4 °C) 76 18 122/64 83 90 % -- -- -- -- --    09/25/24 21:10:01 98.6 °F (37 °C) 83 -- 121/69 86 95 % -- -- None (Room air) -- No Pain    09/25/24  1953 -- -- -- -- -- -- -- -- None (Room air) -- --    09/25/24 17:03:30 98.8 °F (37.1 °C) 76 -- 104/52 69 94 % -- -- -- -- --    09/25/24 1315 -- -- -- -- -- 97 % 24 1 L/min  Nasal cannula -- --    09/25/24 1113 -- -- -- -- -- -- -- -- -- -- 6    09/25/24 1100 -- -- -- -- -- -- -- -- -- -- 6    09/25/24 07:37:57 98.4 °F (36.9 °C) 65 18 112/58 76 92 % -- -- -- -- --    09/25/24 0723 -- 66 17 -- -- 91 % -- -- -- -- --    09/25/24 0708 -- -- -- -- -- 94 % 28 2 L/min Nasal cannula -- --    09/25/24 0131 -- -- -- -- -- -- -- -- -- -- No Pain    09/24/24 2353 -- 65 20 -- -- 97 % -- -- -- -- --    09/24/24 23:11:43 98.2 °F (36.8 °C) 64 22 99/54 69 95 % -- -- -- -- --    09/24/24 2200 -- 67 22 97/55 70 96 % 28 2 L/min Nasal cannula Lying --    09/24/24 2156 -- 83 -- -- -- 93 % -- -- None (Room air) -- --    09/24/24 2130 -- 70 22 102/58 75 95 % -- -- None (Room air) Lying --    09/24/24 2000 -- 71 24 111/56 77 100 % 28 2 L/min Nasal cannula Lying --    09/24/24 1953 -- -- -- -- -- -- -- -- -- -- 8    09/24/24 1855 99.8 °F (37.7 °C) 98 20 113/64 -- 96 % -- -- None (Room air) Sitting --              Pertinent Labs/Diagnostic Test Results:   Radiology:  CTA ED chest PE study   Final Interpretation by Vicente Edwards MD (09/24 2128)      No evidence of pulmonary emboli      Tree-in-bud opacities throughout the right greater then left lung compatible with an infective bronchiolitis      Layering secretions within the distal trachea and bilateral mainstem bronchi            Workstation performed: [id number]         XR chest 2 views   ED Interpretation by Marquez Wilson DO (09/24 2050)   No acute cardiopulmonary disease as interpreted by me        Final Interpretation by Tres Howard MD (09/24 2140)      No acute cardiopulmonary disease.            Workstation performed: [id number]           Cardiology:  No orders to display     GI:  No orders to display       Results from last 7 days   Lab Units 09/25/24  0801  09/25/24  0033   SARS-COV-2  Not Detected Negative     Results from last 7 days   Lab Units 09/26/24  0509 09/25/24  0526 09/24/24 1942   WBC Thousand/uL 12.63* 13.56* 17.24*   HEMOGLOBIN g/dL 10.9* 11.1* 12.5   HEMATOCRIT % 34.8 35.4 39.7   PLATELETS Thousands/uL 337 353 406*   TOTAL NEUT ABS Thousands/µL  --  9.26* 12.25*         Results from last 7 days   Lab Units 09/26/24  0509 09/25/24  0526 09/24/24 1942   SODIUM mmol/L 140 139 139   POTASSIUM mmol/L 4.7 4.6 4.6   CHLORIDE mmol/L 106 105 102   CO2 mmol/L 30 28 31   ANION GAP mmol/L 4 6 6   BUN mg/dL 9 11 10   CREATININE mg/dL 0.54* 0.50* 0.55*   EGFR ml/min/1.73sq m 107 110 106   CALCIUM mg/dL 8.6 8.8 9.6   MAGNESIUM mg/dL  --   --  2.3     Results from last 7 days   Lab Units 09/26/24  0509   AST U/L 10*   ALT U/L 8   ALK PHOS U/L 74   TOTAL PROTEIN g/dL 6.5   ALBUMIN g/dL 3.0*   TOTAL BILIRUBIN mg/dL 0.18*         Results from last 7 days   Lab Units 09/26/24  0509 09/25/24  0526 09/24/24 1942   GLUCOSE RANDOM mg/dL 113 110 107       Results from last 7 days   Lab Units 09/25/24  0526 09/24/24 1942   PROCALCITONIN ng/ml <0.05 <0.05     Results from last 7 days   Lab Units 09/25/24  0526   LACTIC ACID mmol/L 0.6           Results from last 7 days   Lab Units 09/25/24  0801 09/25/24  0033   INFLUENZA A PCR   --  Negative   INFLUENZA B PCR   --  Negative   INFLUENZA B  Not Detected  --    RSV PCR   --  Negative   RESPIRATORY SYNCYTIAL VIRUS  Not Detected  --      Results from last 7 days   Lab Units 09/25/24  0801   ADENOVIRUS  Not Detected   BORDETELLA PARAPERTUSSIS  Not Detected   BORDETELLA PERTUSSIS  Not Detected   CHLAMYDIA PNEUMONIAE  Not Detected   CORONAVIRUS 229E  Not Detected   CORONAVIRUS HKU1  Not Detected   CORONAVIRUS NL63  Not Detected   CORONAVIRUS OC43  Not Detected   METAPNEUMOVIRUS  Not Detected   RHINOVIRUS  Detected*   MYCOPLASMA PNEUMONIAE  Not Detected   PARAINFLUENZA 1  Not Detected   PARAINFLUENZA 2  Not Detected   PARAINFLUENZA 3   Not Detected   PARAINFLUENZA 4  Not Detected         Results from last 7 days   Lab Units 09/25/24  0535   BLOOD CULTURE  No Growth at 24 hrs.  No Growth at 24 hrs.       History reviewed. No pertinent past medical history.  Present on Admission:   Tobacco use disorder      Admitting Diagnosis: Shortness of breath [R06.02]  Bronchiolitis [J21.9]  Hypoxia [R09.02]  Flu-like symptoms [R68.89]  Age/Sex: 54 y.o. female    Network Utilization Review Department  ATTENTION: Please call with any questions or concerns to [phone] and carefully listen to the prompts so that you are directed to the right person. All voicemails are confidential.   For Discharge needs, contact Care Management DC Support Team at [phone] opt. 2  Send all requests for admission clinical reviews, approved or denied determinations and any other requests to dedicated fax number below belonging to the campus where the patient is receiving treatment. List of dedicated fax numbers for the Facilities:  FACILITY NAME UR FAX NUMBER   ADMISSION DENIALS (Administrative/Medical Necessity) [phone]   DISCHARGE SUPPORT TEAM (NETWORK) [phone]   PARENT CHILD HEALTH (Maternity/NICU/Pediatrics) [phone]   [facility] [phone]   [facility] [phone]   [facility] [phone]   [facility] [phone]   [facility] [phone]   General acute hospital [phone]   [facility] [phone]   [facility] [phone]   [facility] [phone]   Critical access hospital [phone]   [facility] [phone]   [facility] [phone]

## 2024-09-25 NOTE — RESPIRATORY THERAPY NOTE
"RT Protocol Note  Jenifer Stephens 54 y.o. female MRN: [id number]  Unit/Bed#: -01 Encounter: [id number]    Assessment    Principal Problem:    Sepsis (HCC)  Active Problems:    Tobacco use disorder    Bronchiolitis    Physical Exam:   Assessment Type: Post-treatment  General Appearance: Alert, Awake  Respiratory Pattern: Normal  Chest Assessment: Chest expansion symmetrical  Bilateral Breath Sounds: Diminished, Coarse  Cough: Non-productive, Weak  O2 Device: nc    Vitals:  Blood pressure 112/58, pulse 65, temperature 98.4 °F (36.9 °C), resp. rate 18, height 5' 5\" (1.651 m), weight 73.4 kg (161 lb 13.1 oz), SpO2 92%.      O2 Device: nc     Plan    Respiratory Plan: No distress/Pulmonary history        Resp Comments: will cont w current reigmen as per bronchiolitis   "

## 2024-09-25 NOTE — ASSESSMENT & PLAN NOTE
Smokes 1 PPD -- has not smoked in 1 week due to feeling ill   Nicotine patch declined  Encourage cessation

## 2024-09-25 NOTE — PLAN OF CARE
Problem: PAIN - ADULT  Goal: Verbalizes/displays adequate comfort level or baseline comfort level  Description: Interventions:  - Encourage patient to monitor pain and request assistance  - Assess pain using appropriate pain scale  - Administer analgesics based on type and severity of pain and evaluate response  - Implement non-pharmacological measures as appropriate and evaluate response  - Consider cultural and social influences on pain and pain management  - Notify physician/advanced practitioner if interventions unsuccessful or patient reports new pain  Outcome: Progressing     Problem: INFECTION - ADULT  Goal: Absence or prevention of progression during hospitalization  Description: INTERVENTIONS:  - Assess and monitor for signs and symptoms of infection  - Monitor lab/diagnostic results  - Monitor all insertion sites, i.e. indwelling lines, tubes, and drains  - Monitor endotracheal if appropriate and nasal secretions for changes in amount and color  - Candler appropriate cooling/warming therapies per order  - Administer medications as ordered  - Instruct and encourage patient and family to use good hand hygiene technique  - Identify and instruct in appropriate isolation precautions for identified infection/condition  Outcome: Progressing     Problem: SAFETY ADULT  Goal: Patient will remain free of falls  Description: INTERVENTIONS:  - Educate patient/family on patient safety including physical limitations  - Instruct patient to call for assistance with activity   - Consult OT/PT to assist with strengthening/mobility   - Keep Call bell within reach  - Keep bed low and locked with side rails adjusted as appropriate  - Keep care items and personal belongings within reach  - Initiate and maintain comfort rounds  - Make Fall Risk Sign visible to staff  - Offer Toileting every 2 Hours, in advance of need  - Initiate/Maintain bed alarm  - Obtain necessary fall risk management equipment: walker  - Apply yellow  socks and bracelet for high fall risk patients  - Consider moving patient to room near nurses station  Outcome: Progressing

## 2024-09-25 NOTE — CASE MANAGEMENT
Case Management Assessment & Discharge Planning Note    Patient name Jenifer Stephens  Location /-01 MRN [id number]  : 1970 Date 2024       Current Admission Date: 2024  Current Admission Diagnosis:Sepsis (HCC)   Patient Active Problem List    Diagnosis Date Noted Date Diagnosed    Bronchiolitis 2024     Sepsis (HCC) 2024     Renal stones 2024     B-complex deficiency 2015     Iron deficiency anemia 2015     Vitamin D deficiency 2015     Cellulitis of knee, right 10/06/2014     Panic attacks 10/06/2014     Bipolar affective disorder (HCC) 2014     Status post bariatric surgery 2012     Esophageal reflux 2011     Pain in joints 2011     Major depressive disorder, recurrent episode, moderate (HCC) 06/10/2011     Hypercholesterolemia 2011     Impaired fasting glucose 2011     Tobacco use disorder 2011       LOS (days): 0  Geometric Mean LOS (GMLOS) (days):   Days to GMLOS:     OBJECTIVE:              Current admission status: Observation       [facility]:   [id number] [place] [facility] [address] [address]  Phone: [phone] Fax: [phone]    Primary Care Provider: No primary care provider on file.    Primary Insurance: [employer]UD[employer]ARRIS PENDING  Secondary Insurance:     ASSESSMENT:  Active Health Care Proxies    There are no active Health Care Proxies on file.       Advance Directives  Does patient have a Health Care POA?: No  Was patient offered paperwork?: Yes (offered and refused)  Does patient have Advance Directives?: No  Was patient offered paperwork?: Yes (offered and declined)  Primary Contact: David Perez (Friend)  [phone]         Readmission Root Cause  30 Day Readmission: No    Patient Information  Admitted from:: Home  Mental Status: Alert  During Assessment patient was accompanied by: Not accompanied during assessment  Assessment  information provided by:: Patient  Primary Caregiver: Self  Support Systems: Friend, Family members  County of Residence: [place]  What city do you live in?: [place]  Home entry access options. Select all that apply.: Stairs  Number of steps to enter home.: 1  Do the steps have railings?: Yes  Type of Current Residence: [address]  Upon entering residence, is there a bedroom on the main floor (no further steps)?: Yes  Upon entering residence, is there a bathroom on the main floor (no further steps)?: Yes  Living Arrangements: Lives w/ Extended Family  Is patient a ?: No    Activities of Daily Living Prior to Admission  Functional Status: Independent  Completes ADLs independently?: Yes  Ambulates independently?: Yes  Does patient use assisted devices?: No  Does patient currently own DME?: No  Does patient have a history of Outpatient Therapy (PT/OT)?: No  Does the patient have a history of Short-Term Rehab?: No  Does patient have a history of HHC?: No  Does patient currently have HHC?: No         Patient Information Continued  Income Source: Self-employed  Does patient have prescription coverage?: No (uses good rx)  Does patient receive dialysis treatments?: No  Does patient have a history of substance abuse?: Yes  Historical substance use preference: Methamphetamines  Is patient currently in treatment for substance abuse?: No. Treatment options provided (pt states she is sober 8months)  Does patient have a history of Mental Health Diagnosis?: No    PHQ 2/9 Screening   Reviewed PHQ 2/9 Depression Screening Score?: No    Means of Transportation  Means of Transport to Appts:: Family transport      Social Determinants of Health (SDOH)      Flowsheet Row Most Recent Value   Housing Stability    In the last 12 months, was there a time when you were not able to pay the mortgage or rent on time? N   In the past 12 months, how many times have you moved where you were living? 1   At any time in the past 12 months,  were you homeless or living in a shelter (including now)? N   Transportation Needs    In the past 12 months, has lack of transportation kept you from medical appointments or from getting medications? no   In the past 12 months, has lack of transportation kept you from meetings, work, or from getting things needed for daily living? No   Food Insecurity    Within the past 12 months, you worried that your food would run out before you got the money to buy more. Never true   Within the past 12 months, the food you bought just didn't last and you didn't have money to get more. Never true   Utilities    In the past 12 months has the electric, gas, oil, or water company threatened to shut off services in your home? No            DISCHARGE DETAILS:    Discharge planning discussed with:: pt over the phone due to COVID  Freedom of Choice: Yes  Comments - Freedom of Choice: CM called and spoke with pt and introduced self/role. Pt is alert and oriented able to make her needs known and encouraged to do so. FOC discussed at length. Pt has an AMPAC of 22, no CM needs anticipated. Pt has no insurance and currently MA pending. Pt dc plan is to return home once medically cleared. CM continues to follow and assist with pt dc plans.  CM contacted family/caregiver?: No- see comments (pt declined)  Were Treatment Team discharge recommendations reviewed with patient/caregiver?: Yes  Did patient/caregiver verbalize understanding of patient care needs?: Yes  Were patient/caregiver advised of the risks associated with not following Treatment Team discharge recommendations?: Yes    Contacts  Reason/Outcome: Continuity of Care, Emergency Contact, Referral, Discharge Planning    Requested Home Health Care         Is the patient interested in C at discharge?: No    DME Referral Provided  Referral made for DME?: No    Other Referral/Resources/Interventions Provided:  Interventions: None Indicated    Would you like to participate in our [facility] service program?  : No - Declined    Treatment Team Recommendation: Home  Discharge Destination Plan:: Home  Transport at Discharge : Family

## 2024-09-25 NOTE — PROGRESS NOTES
Progress Note - Hospitalist   Name: Jenifer Stephens 54 y.o. female I MRN: [id number]  Unit/Bed#: -01 I Date of Admission: 9/24/2024   Date of Service: 9/25/2024 I Hospital Day: 0    Assessment & Plan  Sepsis (HCC)  Patient presents with 2 weeks of cough, chills, shortness of breath  Noted to have leukocytosis 18K, tachypnea, mild tachycardia in the ER at admission  CTA chest: Tree-in-bud opacities throughout the right greater then left lung compatible with an infective bronchiolitis. Layering secretions within the distal trachea and bilateral mainstem bronchi.   Suspect likely viral etiology in the setting of bronchiolitis  Obtain blood cultures, lactic acid, procalcitonin   IVF to continue  Azithromycin started on 9/25/2024  Results from last 7 days   Lab Units 09/25/24  0535 09/25/24  0033   BLOOD CULTURE  Received in Microbiology Lab. Culture in Progress.  Received in Microbiology Lab. Culture in Progress.  --    INFLUENZA A PCR   --  Negative       Bronchiolitis  Bronchiolitis noted on CTA chest   COVID and flu negative, not tested for RSV in the ED   Patient smokes 1 PPD for about 40 years  Check COVID/flu/RSV swab -- if negative for RSV will order RP2 swab   Xopenex/Atrovent TID   Mucinex, PRN Tessalon perles   Respiratory protocol   Patient currently placed on 2L NC for comfort, no documented hypoxia in the ER   Tobacco use disorder  Smokes 1 PPD -- has not smoked in 1 week due to feeling ill   Nicotine patch declined  Encourage cessation     VTE Pharmacologic Prophylaxis: VTE Score: 3 Moderate Risk (Score 3-4) - Pharmacological DVT Prophylaxis Ordered: enoxaparin (Lovenox).    Mobility:   Basic Mobility Inpatient Raw Score: 23  JH-HLM Goal: 7: Walk 25 feet or more  JH-HLM Achieved: 6: Walk 10 steps or more  JH-HLM Goal achieved. Continue to encourage appropriate mobility.    Patient Centered Rounds: I performed bedside rounds with nursing staff today.   Discussions with Specialists or Other Care Team  Provider: Case management team    Education and Discussions with Family / Patient:  Spoke to patient at length at bedside.     Current Length of Stay: 0 day(s)  Current Patient Status: Inpatient   Certification Statement: The patient will continue to require additional inpatient hospital stay due to respiratory difficulty and intravenous antibiotics need  Discharge Plan: Anticipate discharge in 24-48 hrs to home.    Code Status: Level 1 - Full Code    Subjective   Patient continues to have some shortness of breath.  Shortness of breath is present at rest and increases with even minimal exertion.  No fever or chills at this point of time.  Mild cough but no expectoration.  Has generalized weakness.  No chest pain, palpitations or diaphoresis    Objective     Vitals:   Temp (24hrs), Av.8 °F (37.1 °C), Min:98.2 °F (36.8 °C), Max:99.8 °F (37.7 °C)    Temp:  [98.2 °F (36.8 °C)-99.8 °F (37.7 °C)] 98.8 °F (37.1 °C)  HR:  [64-98] 76  Resp:  [17-24] 18  BP: ()/(52-64) 104/52  SpO2:  [91 %-100 %] 94 %  Body mass index is 26.93 kg/m².     Input and Output Summary (last 24 hours):     Intake/Output Summary (Last 24 hours) at 2024 1728  Last data filed at 2024 1109  Gross per 24 hour   Intake 1339.59 ml   Output --   Net 1339.59 ml       Physical Exam     General exam- looks a little weak  HEENT - atraumatic and normocephalic  Neck- supple  Skin - no fresh rash  Extremities no fresh focal deformities  Cardiovascular- S1-S2 heard  Respiratory- bilateral air entry present, no crackles or rhonchi  Skin - no fresh rash  Abdomen - normal bowel sounds present, no rebound tenderness  CNS- No fresh focal deficits  Psych- no acute psychosis      Lines/Drains:  Lines/Drains/Airways       Active Status       None                            Lab Results: I have reviewed the following results:   Results from last 7 days   Lab Units 24  0526   WBC Thousand/uL 13.56*   HEMOGLOBIN g/dL 11.1*   HEMATOCRIT % 35.4    PLATELETS Thousands/uL 353   SEGS PCT % 68   LYMPHO PCT % 21   MONO PCT % 6   EOS PCT % 2     Results from last 7 days   Lab Units 09/25/24  0526   SODIUM mmol/L 139   POTASSIUM mmol/L 4.6   CHLORIDE mmol/L 105   CO2 mmol/L 28   BUN mg/dL 11   CREATININE mg/dL 0.50*   ANION GAP mmol/L 6   CALCIUM mg/dL 8.8   GLUCOSE RANDOM mg/dL 110                 Results from last 7 days   Lab Units 09/25/24  0526 09/24/24  1942   LACTIC ACID mmol/L 0.6  --    PROCALCITONIN ng/ml <0.05 <0.05       Recent Cultures (last 7 days):   Results from last 7 days   Lab Units 09/25/24  0535   BLOOD CULTURE  Received in Microbiology Lab. Culture in Progress.  Received in Microbiology Lab. Culture in Progress.       Imaging Review: No pertinent imaging studies reviewed.  Other Studies: No additional pertinent studies reviewed.    Last 24 Hours Medication List:     Current Facility-Administered Medications:     acetaminophen (TYLENOL) tablet 650 mg, Q6H PRN    albuterol (PROVENTIL HFA,VENTOLIN HFA) inhaler 2 puff, Q6H PRN    azithromycin (ZITHROMAX) 500 mg in sodium chloride 0.9 % 250 mL IVPB, Q24H, Last Rate: 500 mg (09/25/24 1531)    benzonatate (TESSALON PERLES) capsule 100 mg, TID PRN    enoxaparin (LOVENOX) subcutaneous injection 40 mg, Daily    guaiFENesin (MUCINEX) 12 hr tablet 600 mg, BID    ipratropium (ATROVENT) 0.02 % inhalation solution 0.5 mg, TID    levalbuterol (XOPENEX) inhalation solution 1.25 mg, TID **AND** [DISCONTINUED] sodium chloride 0.9 % inhalation solution 3 mL, TID    sodium chloride 0.9 % infusion, Continuous, Last Rate: 100 mL/hr (09/25/24 1404)    Administrative Statements   Today, Patient Was Seen By: Roddy Vora MD    **Please Note: This note may have been constructed using a voice recognition system.**

## 2024-09-25 NOTE — ASSESSMENT & PLAN NOTE
Bronchiolitis noted on CTA chest   COVID and flu negative, not tested for RSV in the ED   Patient smokes 1 PPD for about 40 years  Check COVID/flu/RSV swab -- if negative for RSV will order RP2 swab   Xopenex/Atrovent TID   Mucinex, PRN Tessalon perles   Respiratory protocol   Patient currently placed on 2L NC for comfort, no documented hypoxia in the ER

## 2024-09-26 VITALS
WEIGHT: 161.82 LBS | DIASTOLIC BLOOD PRESSURE: 64 MMHG | SYSTOLIC BLOOD PRESSURE: 122 MMHG | HEIGHT: 65 IN | TEMPERATURE: 97.6 F | OXYGEN SATURATION: 91 % | RESPIRATION RATE: 18 BRPM | BODY MASS INDEX: 26.96 KG/M2 | HEART RATE: 76 BPM

## 2024-09-26 LAB
ALBUMIN SERPL BCG-MCNC: 3 G/DL (ref 3.5–5)
ALP SERPL-CCNC: 74 U/L (ref 34–104)
ALT SERPL W P-5'-P-CCNC: 8 U/L (ref 7–52)
ANION GAP SERPL CALCULATED.3IONS-SCNC: 4 MMOL/L (ref 4–13)
AST SERPL W P-5'-P-CCNC: 10 U/L (ref 13–39)
ATRIAL RATE: 79 BPM
BILIRUB SERPL-MCNC: 0.18 MG/DL (ref 0.2–1)
BUN SERPL-MCNC: 9 MG/DL (ref 5–25)
CALCIUM ALBUM COR SERPL-MCNC: 9.4 MG/DL (ref 8.3–10.1)
CALCIUM SERPL-MCNC: 8.6 MG/DL (ref 8.4–10.2)
CHLORIDE SERPL-SCNC: 106 MMOL/L (ref 96–108)
CO2 SERPL-SCNC: 30 MMOL/L (ref 21–32)
CREAT SERPL-MCNC: 0.54 MG/DL (ref 0.6–1.3)
ERYTHROCYTE [DISTWIDTH] IN BLOOD BY AUTOMATED COUNT: 14.7 % (ref 11.6–15.1)
GFR SERPL CREATININE-BSD FRML MDRD: 107 ML/MIN/1.73SQ M
GLUCOSE SERPL-MCNC: 113 MG/DL (ref 65–140)
HCT VFR BLD AUTO: 34.8 % (ref 34.8–46.1)
HGB BLD-MCNC: 10.9 G/DL (ref 11.5–15.4)
MCH RBC QN AUTO: 28.9 PG (ref 26.8–34.3)
MCHC RBC AUTO-ENTMCNC: 31.3 G/DL (ref 31.4–37.4)
MCV RBC AUTO: 92 FL (ref 82–98)
P AXIS: 59 DEGREES
PLATELET # BLD AUTO: 337 THOUSANDS/UL (ref 149–390)
PMV BLD AUTO: 8.9 FL (ref 8.9–12.7)
POTASSIUM SERPL-SCNC: 4.7 MMOL/L (ref 3.5–5.3)
PR INTERVAL: 122 MS
PROT SERPL-MCNC: 6.5 G/DL (ref 6.4–8.4)
QRS AXIS: 99 DEGREES
QRSD INTERVAL: 72 MS
QT INTERVAL: 326 MS
QTC INTERVAL: 373 MS
RBC # BLD AUTO: 3.77 MILLION/UL (ref 3.81–5.12)
SODIUM SERPL-SCNC: 140 MMOL/L (ref 135–147)
T WAVE AXIS: 78 DEGREES
VENTRICULAR RATE: 79 BPM
WBC # BLD AUTO: 12.63 THOUSAND/UL (ref 4.31–10.16)

## 2024-09-26 PROCEDURE — 99239 HOSP IP/OBS DSCHRG MGMT >30: CPT | Performed by: INTERNAL MEDICINE

## 2024-09-26 PROCEDURE — 85027 COMPLETE CBC AUTOMATED: CPT | Performed by: INTERNAL MEDICINE

## 2024-09-26 PROCEDURE — 94640 AIRWAY INHALATION TREATMENT: CPT

## 2024-09-26 PROCEDURE — 80053 COMPREHEN METABOLIC PANEL: CPT | Performed by: INTERNAL MEDICINE

## 2024-09-26 PROCEDURE — 93010 ELECTROCARDIOGRAM REPORT: CPT | Performed by: STUDENT IN AN ORGANIZED HEALTH CARE EDUCATION/TRAINING PROGRAM

## 2024-09-26 PROCEDURE — 94760 N-INVAS EAR/PLS OXIMETRY 1: CPT

## 2024-09-26 RX ORDER — ALBUTEROL SULFATE 90 UG/1
2 INHALANT RESPIRATORY (INHALATION) EVERY 6 HOURS PRN
Qty: 6.7 G | Refills: 0 | Status: SHIPPED | OUTPATIENT
Start: 2024-09-26

## 2024-09-26 RX ORDER — BENZONATATE 100 MG/1
100 CAPSULE ORAL 3 TIMES DAILY PRN
Qty: 20 CAPSULE | Refills: 0 | Status: SHIPPED | OUTPATIENT
Start: 2024-09-26

## 2024-09-26 RX ORDER — AZITHROMYCIN 500 MG/1
500 TABLET, FILM COATED ORAL DAILY
Qty: 3 TABLET | Refills: 0 | Status: SHIPPED | OUTPATIENT
Start: 2024-09-27 | End: 2024-09-30

## 2024-09-26 RX ORDER — ACETAMINOPHEN 325 MG/1
650 TABLET ORAL EVERY 6 HOURS PRN
Start: 2024-09-26

## 2024-09-26 RX ADMIN — IPRATROPIUM BROMIDE 0.5 MG: 0.5 SOLUTION RESPIRATORY (INHALATION) at 13:42

## 2024-09-26 RX ADMIN — BENZONATATE 100 MG: 100 CAPSULE ORAL at 04:04

## 2024-09-26 RX ADMIN — SODIUM CHLORIDE 100 ML/HR: 0.9 INJECTION, SOLUTION INTRAVENOUS at 07:34

## 2024-09-26 RX ADMIN — ENOXAPARIN SODIUM 40 MG: 40 INJECTION SUBCUTANEOUS at 10:19

## 2024-09-26 RX ADMIN — LEVALBUTEROL HYDROCHLORIDE 1.25 MG: 1.25 SOLUTION RESPIRATORY (INHALATION) at 13:42

## 2024-09-26 RX ADMIN — GUAIFENESIN 600 MG: 600 TABLET, EXTENDED RELEASE ORAL at 10:19

## 2024-09-26 NOTE — PLAN OF CARE
Problem: PAIN - ADULT  Goal: Verbalizes/displays adequate comfort level or baseline comfort level  Description: Interventions:  - Encourage patient to monitor pain and request assistance  - Assess pain using appropriate pain scale  - Administer analgesics based on type and severity of pain and evaluate response  - Implement non-pharmacological measures as appropriate and evaluate response  - Consider cultural and social influences on pain and pain management  - Notify physician/advanced practitioner if interventions unsuccessful or patient reports new pain  9/26/2024 0111 by Aliza Pinto  Outcome: Progressing  9/26/2024 0111 by Aliza Pinto  Outcome: Progressing     Problem: INFECTION - ADULT  Goal: Absence or prevention of progression during hospitalization  Description: INTERVENTIONS:  - Assess and monitor for signs and symptoms of infection  - Monitor lab/diagnostic results  - Monitor all insertion sites, i.e. indwelling lines, tubes, and drains  - Monitor endotracheal if appropriate and nasal secretions for changes in amount and color  - [place] appropriate cooling/warming therapies per order  - Administer medications as ordered  - Instruct and encourage patient and family to use good hand hygiene technique  - Identify and instruct in appropriate isolation precautions for identified infection/condition  9/26/2024 0111 by Aliza Pinto  Outcome: Progressing  9/26/2024 0111 by Aliza Pinto  Outcome: Progressing     Problem: SAFETY ADULT  Goal: Patient will remain free of falls  Description: INTERVENTIONS:  - Educate patient/family on patient safety including physical limitations  - Instruct patient to call for assistance with activity   - Consult OT/PT to assist with strengthening/mobility   - Keep Call bell within reach  - Keep bed low and locked with side rails adjusted as appropriate  - Keep care items and personal belongings within reach  - Initiate and maintain comfort rounds  - Make Fall  Risk Sign visible to staff  - Offer Toileting every 2 Hours, in advance of need  - Initiate/Maintain bed alarm  - Obtain necessary fall risk management equipment:    - Apply yellow socks and bracelet for high fall risk patients  - Consider moving patient to room near nurses station  9/26/2024 0111 by Aliza Pinto  Outcome: Progressing  9/26/2024 0111 by Aliza Pinto  Outcome: Progressing

## 2024-09-26 NOTE — ASSESSMENT & PLAN NOTE
Smokes 1 PPD -- has not smoked in 1 week due to feeling ill   Nicotine patch declined  Encourage cessation.

## 2024-09-26 NOTE — DISCHARGE SUMMARY
Discharge Summary - Hospitalist   Name: Jenifer Stephens 54 y.o. female I MRN: [id number]  Unit/Bed#: -01 I Date of Admission: 9/24/2024   Date of Service: 9/26/2024 I Hospital Day: 1     Assessment & Plan  Sepsis (HCC)  Patient presents with 2 weeks of cough, chills, shortness of breath  Noted to have leukocytosis 18K, tachypnea, mild tachycardia in the ER at admission  CTA chest: Tree-in-bud opacities throughout the right greater then left lung compatible with an infective bronchiolitis. Layering secretions within the distal trachea and bilateral mainstem bronchi.   Suspect likely viral etiology in the setting of bronchiolitis  Azithromycin started on 9/25/2024  Results from last 7 days   Lab Units 09/25/24  0535 09/25/24  0033   BLOOD CULTURE  No Growth at 24 hrs.  No Growth at 24 hrs.  --    INFLUENZA A PCR   --  Negative     Cultures showed no growth and patient stable for discharge today  Bronchiolitis  Bronchiolitis noted on CTA chest   COVID and flu negative, not tested for RSV in the ED   Patient smokes 1 PPD for about 40 years  Check COVID/flu/RSV swab   X improved on azithromycin and being discharged today on oral antibiotics  Tobacco use disorder  Smokes 1 PPD -- has not smoked in 1 week due to feeling ill   Nicotine patch declined  Encourage cessation.   Admitting Provider:  Roddy Vora MD  Discharge Provider:  Roddy Vora MD  Admission Date: 9/24/2024       Discharge Date: 09/26/24   LOS: 1  Primary Care Physician at Discharge: No primary care provider on file. None    HOSPITAL COURSE:  Jenifer Stephens is a 54 y.o. female who presented with shortness of breath and diagnosed with bronchiolitis on CTA chest.  Viral swabs negative however patient presented with clinical signs of viral sepsis.  Initially antibiotics were not given but on addition of azithromycin the patient improved.  Patient did not need any oxygen and is ambulating well.  Patient is being discharged home on oral  azithromycin on 9/26/2024.  Mobility:   Basic Mobility Inpatient Raw Score: 23  JH-HLM Goal: 7: Walk 25 feet or more  JH-HLM Achieved: 6: Walk 10 steps or more  JH-HLM Goal achieved. Continue to encourage appropriate mobility.    REASON FOR ADMISSION/ ADMISSION DIAGNOSES    Shortness of breath secondary to bronchiolitis    DISCHARGE DIAGNOSES  Bronchiolitis  Assessment & Plan  Bronchiolitis noted on CTA chest   COVID and flu negative, not tested for RSV in the ED   Patient smokes 1 PPD for about 40 years  Check COVID/flu/RSV swab   X improved on azithromycin and being discharged today on oral antibiotics    * Sepsis (HCC)  Assessment & Plan  Patient presents with 2 weeks of cough, chills, shortness of breath  Noted to have leukocytosis 18K, tachypnea, mild tachycardia in the ER at admission  CTA chest: Tree-in-bud opacities throughout the right greater then left lung compatible with an infective bronchiolitis. Layering secretions within the distal trachea and bilateral mainstem bronchi.   Suspect likely viral etiology in the setting of bronchiolitis  Azithromycin started on 9/25/2024  Results from last 7 days   Lab Units 09/25/24  0535 09/25/24  0033   BLOOD CULTURE  No Growth at 24 hrs.  No Growth at 24 hrs.  --    INFLUENZA A PCR   --  Negative       Cultures showed no growth and patient stable for discharge today    Tobacco use disorder  Assessment & Plan  Smokes 1 PPD -- has not smoked in 1 week due to feeling ill   Nicotine patch declined  Encourage cessation.      CONSULTING PROVIDERS   None    PROCEDURES PERFORMED  * No surgery found *    RADIOLOGY RESULTS  XR chest 2 views    Result Date: 9/24/2024  Impression: No acute cardiopulmonary disease. Workstation performed: [id number]     CTA ED chest PE study    Result Date: 9/24/2024  Impression: No evidence of pulmonary emboli Tree-in-bud opacities throughout the right greater then left lung compatible with an infective bronchiolitis Layering secretions within  "the distal trachea and bilateral mainstem bronchi Workstation performed: [id number]       LABS  Results from last 7 days   Lab Units 09/26/24  0509 09/25/24  0526 09/24/24 1942   WBC Thousand/uL 12.63* 13.56* 17.24*   HEMOGLOBIN g/dL 10.9* 11.1* 12.5   HEMATOCRIT % 34.8 35.4 39.7   MCV fL 92 92 91   PLATELETS Thousands/uL 337 353 406*     Results from last 7 days   Lab Units 09/26/24  0509 09/25/24  0526 09/24/24 1942   SODIUM mmol/L 140 139 139   POTASSIUM mmol/L 4.7 4.6 4.6   CHLORIDE mmol/L 106 105 102   CO2 mmol/L 30 28 31   BUN mg/dL 9 11 10   CREATININE mg/dL 0.54* 0.50* 0.55*   CALCIUM mg/dL 8.6 8.8 9.6   ALBUMIN g/dL 3.0*  --   --    TOTAL BILIRUBIN mg/dL 0.18*  --   --    ALK PHOS U/L 74  --   --    ALT U/L 8  --   --    AST U/L 10*  --   --    EGFR ml/min/1.73sq m 107 110 106   GLUCOSE RANDOM mg/dL 113 110 107                              Results from last 7 days   Lab Units 09/25/24  0526   LACTIC ACID mmol/L 0.6   PROCALCITONIN ng/ml <0.05           Cultures:             Results from last 7 days   Lab Units 09/25/24  0535 09/25/24  0033   BLOOD CULTURE  No Growth at 24 hrs.  No Growth at 24 hrs.  --    INFLUENZA A PCR   --  Negative       PHYSICAL EXAM:  Vitals:   Blood Pressure: 122/64 (09/26/24 0719)  Pulse: 76 (09/26/24 0719)  Temperature: 97.6 °F (36.4 °C) (09/26/24 0719)  Temp Source: Oral (09/25/24 1704)  Respirations: 18 (09/26/24 0719)  Height: 5' 5\" (165.1 cm) (09/24/24 2353)  Weight - Scale: 73.4 kg (161 lb 13.1 oz) (09/25/24 0639)  SpO2: 91 % (09/26/24 1342)    General appearance: alert, fatigued, appears stated age, and cooperative  HEENT - atraumatic and normocephalic  Neck- supple  Skin - no fresh rash  Extremities no fresh focal deformities  Cardiovascular- S1-S2 heard  Respiratory- bilateral air entry present, no crackles or rhonchi  Skin - no fresh rash  Abdomen - normal bowel sounds present, no rebound tenderness  CNS- No fresh focal deficits  Psych- no acute psychosis     Planned " Re-admission: No  Discharge Disposition: Home    Test Results Pending at Discharge:   Pending Labs       Order Current Status    Blood culture Preliminary result    Blood culture Preliminary result        Incidental findings: None except CT chest that showed bronchiolitis    Medications   Summary of Medication Adjustments made as a result of this hospitalization: Oral azithromycin  Medication Dosing Tapers - Please refer to Discharge Medication List for details on any medication dosing tapers (if applicable to patient).  Discharge Medication List: See after visit summary for reconciled discharge medications.     Diet restrictions: Heart healthy diet       Diet Orders   (From admission, onward)                 Start     Ordered    09/24/24 2333  Diet Regular; Regular House  Diet effective now        References:    Adult Nutrition Support Algorithm    RD Therapeutic Diet Order Protocol   Question Answer Comment   Diet Type Regular    Regular Regular House    RD to adjust diet per protocol? Yes        09/24/24 2336                  Activity restrictions: No strenuous activity  Discharge Condition: fair    Outpatient Follow-Up and Discharge Instructions  See after visit summary section titled Discharge Instructions for information provided to patient and family.      Code Status: Level 1 - Full Code  Discharge Statement   I spent 35 minutes discharging the patient. This time was spent on the day of discharge. Greater than 50% of total time was spent with the patient and / or family counseling and / or coordination of care.    Roddy Vora MD  [facility] Internal Medicine    ** Please Note: This note has been constructed using a voice recognition system. **

## 2024-09-26 NOTE — NURSING NOTE
AVS reviewed with pt. No questions or concerns at this time. IV site removed. All belongings with pt and accounted for. Pt's S/O to transport home.

## 2024-09-26 NOTE — ASSESSMENT & PLAN NOTE
Bronchiolitis noted on CTA chest   COVID and flu negative, not tested for RSV in the ED   Patient smokes 1 PPD for about 40 years  Check COVID/flu/RSV swab   X improved on azithromycin and being discharged today on oral antibiotics

## 2024-09-26 NOTE — ASSESSMENT & PLAN NOTE
Patient presents with 2 weeks of cough, chills, shortness of breath  Noted to have leukocytosis 18K, tachypnea, mild tachycardia in the ER at admission  CTA chest: Tree-in-bud opacities throughout the right greater then left lung compatible with an infective bronchiolitis. Layering secretions within the distal trachea and bilateral mainstem bronchi.   Suspect likely viral etiology in the setting of bronchiolitis  Azithromycin started on 9/25/2024  Results from last 7 days   Lab Units 09/25/24  0535 09/25/24  0033   BLOOD CULTURE  No Growth at 24 hrs.  No Growth at 24 hrs.  --    INFLUENZA A PCR   --  Negative     Cultures showed no growth and patient stable for discharge today

## 2024-09-26 NOTE — PLAN OF CARE
Problem: PAIN - ADULT  Goal: Verbalizes/displays adequate comfort level or baseline comfort level  Description: Interventions:  - Encourage patient to monitor pain and request assistance  - Assess pain using appropriate pain scale  - Administer analgesics based on type and severity of pain and evaluate response  - Implement non-pharmacological measures as appropriate and evaluate response  - Consider cultural and social influences on pain and pain management  - Notify physician/advanced practitioner if interventions unsuccessful or patient reports new pain  Outcome: Progressing     Problem: INFECTION - ADULT  Goal: Absence or prevention of progression during hospitalization  Description: INTERVENTIONS:  - Assess and monitor for signs and symptoms of infection  - Monitor lab/diagnostic results  - Monitor all insertion sites, i.e. indwelling lines, tubes, and drains  - Monitor endotracheal if appropriate and nasal secretions for changes in amount and color  - [place] appropriate cooling/warming therapies per order  - Administer medications as ordered  - Instruct and encourage patient and family to use good hand hygiene technique  - Identify and instruct in appropriate isolation precautions for identified infection/condition  Outcome: Progressing     Problem: SAFETY ADULT  Goal: Patient will remain free of falls  Description: INTERVENTIONS:  - Educate patient/family on patient safety including physical limitations  - Instruct patient to call for assistance with activity   - Consult OT/PT to assist with strengthening/mobility   - Keep Call bell within reach  - Keep bed low and locked with side rails adjusted as appropriate  - Keep care items and personal belongings within reach  - Initiate and maintain comfort rounds  - Make Fall Risk Sign visible to staff  - Offer Toileting every  Hours, in advance of need  - Initiate/Maintain alarm  - Obtain necessary fall risk management equipment:   - Apply yellow socks and  bracelet for high fall risk patients  - Consider moving patient to room near nurses station  Outcome: Progressing

## 2024-09-29 LAB
BACTERIA BLD CULT: NORMAL
BACTERIA BLD CULT: NORMAL

## 2024-09-30 LAB
BACTERIA BLD CULT: NORMAL
BACTERIA BLD CULT: NORMAL

## 2024-10-26 PROBLEM — J21.9 BRONCHIOLITIS: Status: RESOLVED | Noted: 2024-09-24 | Resolved: 2024-10-26

## 2024-10-26 PROBLEM — A41.9 SEPSIS (HCC): Status: RESOLVED | Noted: 2024-09-24 | Resolved: 2024-10-26

## 2025-01-10 ENCOUNTER — HOSPITAL ENCOUNTER (EMERGENCY)
Facility: HOSPITAL | Age: 55
Discharge: HOME | End: 2025-01-10
Attending: EMERGENCY MEDICINE
Payer: COMMERCIAL

## 2025-01-10 VITALS
DIASTOLIC BLOOD PRESSURE: 72 MMHG | RESPIRATION RATE: 20 BRPM | SYSTOLIC BLOOD PRESSURE: 155 MMHG | HEART RATE: 83 BPM | TEMPERATURE: 98 F | OXYGEN SATURATION: 98 %

## 2025-01-10 DIAGNOSIS — S91.312A FOOT LACERATION, LEFT, INITIAL ENCOUNTER: Primary | ICD-10-CM

## 2025-01-10 PROCEDURE — 90471 IMMUNIZATION ADMIN: CPT

## 2025-01-10 PROCEDURE — 99284 EMERGENCY DEPT VISIT MOD MDM: CPT | Performed by: EMERGENCY MEDICINE

## 2025-01-10 PROCEDURE — 90715 TDAP VACCINE 7 YRS/> IM: CPT | Performed by: EMERGENCY MEDICINE

## 2025-01-10 PROCEDURE — 99282 EMERGENCY DEPT VISIT SF MDM: CPT

## 2025-01-10 RX ADMIN — TETANUS TOXOID, REDUCED DIPHTHERIA TOXOID AND ACELLULAR PERTUSSIS VACCINE, ADSORBED 0.5 ML: 5; 2.5; 8; 8; 2.5 SUSPENSION INTRAMUSCULAR at 16:24

## 2025-01-10 NOTE — ED PROVIDER NOTES
Time reflects when diagnosis was documented in both MDM as applicable and the Disposition within this note       Time User Action Codes Description Comment    1/10/2025  3:59 PM Adam Fay Add [S91.312A] Foot laceration, left, initial encounter           ED Disposition       ED Disposition   Discharge    Condition   Stable    Date/Time   Fri Scooby 10, 2025  3:59 PM    Comment   Jenifer Stephens discharge to home/self care.                   Assessment & Plan       Medical Decision Making  Risk  Prescription drug management.    Medical decision making 54-year-old female presents emergency department after stepping on a razor and cutting the edge of her left foot.  Injury occurred greater than 24 hours prior to arrival.  Discussed with patient most wounds can be sewn up in the first 6 hours but this wound is definitely older than that and cannot be sutured.  Also we try not to suture the bottom of the foot because of the risk of infection.  The wound is not bleeding and essentially closing on its own the skin on either side of the wound actually looks dried out and will eventually slough off.  Patient did need a tetanus booster.  Patient complained of pain with ambulation will use crutches and walk primarily on her heel over the next few days until her wound is healed enough that she can put weight on it.  Discussed outpatient treatment and follow-up discussed indications to return.         Medications   tetanus-diphtheria-acellular pertussis (BOOSTRIX) IM injection 0.5 mL (0.5 mL Intramuscular Given 1/10/25 1624)       ED Risk Strat Scores                          SBIRT 20yo+      Flowsheet Row Most Recent Value   Initial Alcohol Screen: US AUDIT-C     1. How often do you have a drink containing alcohol? 0 Filed at: 01/10/2025 4062   2. How many drinks containing alcohol do you have on a typical day you are drinking?  0 Filed at: 01/10/2025 1437   3a. Male UNDER 65: How often do you have five or more drinks on one  occasion? 0 Filed at: 01/10/2025 1437   3b. FEMALE Any Age, or MALE 65+: How often do you have 4 or more drinks on one occassion? 0 Filed at: 01/10/2025 1437   Audit-C Score 0 Filed at: 01/10/2025 1437   SCOTT: How many times in the past year have you...    Used an illegal drug or used a prescription medication for non-medical reasons? Never Filed at: 01/10/2025 1437                            History of Present Illness       Chief Complaint   Patient presents with    Foot Laceration     Left foot laceration from razor blade, needs tetanus        No past medical history on file.   Past Surgical History:   Procedure Laterality Date    GALLBLADDER SURGERY      GASTRIC BYPASS  2012      No family history on file.   Social History     Tobacco Use    Smoking status: Some Days     Current packs/day: 0.50     Types: Cigarettes    Smokeless tobacco: Never   Vaping Use    Vaping status: Never Used   Substance Use Topics    Alcohol use: Not Currently    Drug use: Never      E-Cigarette/Vaping    E-Cigarette Use Never User       E-Cigarette/Vaping Substances      I have reviewed and agree with the history as documented.     HPI the patient is a 54-year-old female she reports greater than 24 hours ago she stepped on a razor blade and cut the lateral aspect of her left foot on the sole of her foot.  Patient was concerned because she is not up-to-date on her tetanus shot and was concerned she might need stitches so she came to the emergency department.  She denies any further bleeding.  She reports pain when she tries to ambulate and put weight on the foot.  Denies any other injury.  Past Richel history of gastric bypass surgery, gallbladder surgery  Family history noncontributory  Social history, smoker, denies drug abuse    Review of Systems   Skin:  Positive for wound.   Neurological:  Negative for weakness and numbness.           Objective       ED Triage Vitals [01/10/25 1437]   Temperature Pulse Blood Pressure Respirations  SpO2 Patient Position - Orthostatic VS   98 °F (36.7 °C) 83 155/72 20 98 % Sitting      Temp Source Heart Rate Source BP Location FiO2 (%) Pain Score    Temporal Monitor Left arm -- --      Vitals      Date and Time Temp Pulse SpO2 Resp BP Pain Score FACES Pain Rating User   01/10/25 1437 98 °F (36.7 °C) 83 98 % 20 155/72 -- -- SG            Physical Exam  Constitutional:       Appearance: Normal appearance.   Musculoskeletal:         General: Normal range of motion.      Comments: No numbness or weakness of the extremity   Skin:     Comments: On the sole of the patient's left foot there is a 3 to 4 cm laceration along the lateral aspect consistent with a laceration from a razor.  Distal neurovascular tendon intact, good capillary refill.  The edges of the wound are somewhat dried skin already , again unable to be sutured   Neurological:      Mental Status: She is alert.         Results Reviewed       None            No orders to display       Procedures    ED Medication and Procedure Management   Prior to Admission Medications   Prescriptions Last Dose Informant Patient Reported? Taking?   acetaminophen (TYLENOL) 325 mg tablet   No No   Sig: Take 2 tablets (650 mg total) by mouth every 6 (six) hours as needed for mild pain, headaches or fever   albuterol (PROVENTIL HFA,VENTOLIN HFA) 90 mcg/act inhaler   No No   Sig: Inhale 2 puffs every 6 (six) hours as needed for shortness of breath or wheezing   benzonatate (TESSALON PERLES) 100 mg capsule   No No   Sig: Take 1 capsule (100 mg total) by mouth 3 (three) times a day as needed for cough      Facility-Administered Medications: None     Discharge Medication List as of 1/10/2025  4:00 PM        CONTINUE these medications which have NOT CHANGED    Details   acetaminophen (TYLENOL) 325 mg tablet Take 2 tablets (650 mg total) by mouth every 6 (six) hours as needed for mild pain, headaches or fever, Starting u 9/26/2024, No Print      albuterol (PROVENTIL  HFA,VENTOLIN HFA) 90 mcg/act inhaler Inhale 2 puffs every 6 (six) hours as needed for shortness of breath or wheezing, Starting Thu 9/26/2024, Normal      benzonatate (TESSALON PERLES) 100 mg capsule Take 1 capsule (100 mg total) by mouth 3 (three) times a day as needed for cough, Starting Thu 9/26/2024, Normal           No discharge procedures on file.  ED SEPSIS DOCUMENTATION   Time reflects when diagnosis was documented in both MDM as applicable and the Disposition within this note       Time User Action Codes Description Comment    1/10/2025  3:59 PM Adam Fay Add [S91.312A] Foot laceration, left, initial encounter                  Adam Fay MD  01/10/25 2006

## 2025-01-10 NOTE — DISCHARGE INSTRUCTIONS
No indication for sutures  Can wash in the tub or the shower  Wound will slowly heal and greatly improved after 4 to 5 days  Return worsening redness swelling pain or any problems

## 2025-02-20 ENCOUNTER — OFFICE VISIT (OUTPATIENT)
Dept: FAMILY MEDICINE CLINIC | Facility: CLINIC | Age: 55
End: 2025-02-20
Payer: COMMERCIAL

## 2025-02-20 VITALS
HEIGHT: 65 IN | OXYGEN SATURATION: 98 % | WEIGHT: 159 LBS | DIASTOLIC BLOOD PRESSURE: 86 MMHG | HEART RATE: 80 BPM | BODY MASS INDEX: 26.49 KG/M2 | SYSTOLIC BLOOD PRESSURE: 138 MMHG | TEMPERATURE: 97.8 F

## 2025-02-20 DIAGNOSIS — Z83.1: ICD-10-CM

## 2025-02-20 DIAGNOSIS — Z98.84 STATUS POST BARIATRIC SURGERY: ICD-10-CM

## 2025-02-20 DIAGNOSIS — Z11.59 ENCOUNTER FOR HEPATITIS C SCREENING TEST FOR LOW RISK PATIENT: ICD-10-CM

## 2025-02-20 DIAGNOSIS — Z12.11 SCREEN FOR COLON CANCER: ICD-10-CM

## 2025-02-20 DIAGNOSIS — D50.8 OTHER IRON DEFICIENCY ANEMIA: ICD-10-CM

## 2025-02-20 DIAGNOSIS — E55.9 VITAMIN D DEFICIENCY: ICD-10-CM

## 2025-02-20 DIAGNOSIS — Z11.4 SCREENING FOR HIV (HUMAN IMMUNODEFICIENCY VIRUS): Primary | ICD-10-CM

## 2025-02-20 DIAGNOSIS — Z00.00 ANNUAL PHYSICAL EXAM: ICD-10-CM

## 2025-02-20 DIAGNOSIS — E53.9 B-COMPLEX DEFICIENCY: ICD-10-CM

## 2025-02-20 DIAGNOSIS — Z13.1 SCREENING FOR DIABETES MELLITUS: ICD-10-CM

## 2025-02-20 DIAGNOSIS — N20.0 RENAL STONES: ICD-10-CM

## 2025-02-20 DIAGNOSIS — F17.200 TOBACCO USE DISORDER: ICD-10-CM

## 2025-02-20 DIAGNOSIS — Z12.4 ENCOUNTER FOR SCREENING FOR CERVICAL CANCER: ICD-10-CM

## 2025-02-20 DIAGNOSIS — Z80.8 FAMILY HISTORY OF MELANOMA: ICD-10-CM

## 2025-02-20 DIAGNOSIS — Z12.31 ENCOUNTER FOR SCREENING MAMMOGRAM FOR MALIGNANT NEOPLASM OF BREAST: ICD-10-CM

## 2025-02-20 DIAGNOSIS — Z11.3 SCREENING FOR STDS (SEXUALLY TRANSMITTED DISEASES): ICD-10-CM

## 2025-02-20 DIAGNOSIS — E78.00 HYPERCHOLESTEROLEMIA: ICD-10-CM

## 2025-02-20 PROCEDURE — 87510 GARDNER VAG DNA DIR PROBE: CPT | Performed by: NURSE PRACTITIONER

## 2025-02-20 PROCEDURE — 87480 CANDIDA DNA DIR PROBE: CPT | Performed by: NURSE PRACTITIONER

## 2025-02-20 PROCEDURE — 99386 PREV VISIT NEW AGE 40-64: CPT | Performed by: NURSE PRACTITIONER

## 2025-02-20 PROCEDURE — 87660 TRICHOMONAS VAGIN DIR PROBE: CPT | Performed by: NURSE PRACTITIONER

## 2025-02-20 PROCEDURE — G0476 HPV COMBO ASSAY CA SCREEN: HCPCS | Performed by: NURSE PRACTITIONER

## 2025-02-20 PROCEDURE — G0145 SCR C/V CYTO,THINLAYER,RESCR: HCPCS | Performed by: NURSE PRACTITIONER

## 2025-02-20 NOTE — PATIENT INSTRUCTIONS
"Patient Education     Routine physical for adults   The Basics   Written by the doctors and editors at [facility]   What is a physical? -- A physical is a routine visit, or \"check-up,\" with your doctor. You might also hear it called a \"wellness visit\" or \"preventive visit.\"  During each visit, the doctor will:   Ask about your physical and mental health   Ask about your habits, behaviors, and lifestyle   Do an exam   Give you vaccines if needed   Talk to you about any medicines you take   Give advice about your health   Answer your questions  Getting regular check-ups is an important part of taking care of your health. It can help your doctor find and treat any problems you have. But it's also important for preventing health problems.  A routine physical is different from a \"sick visit.\" A sick visit is when you see a doctor because of a health concern or problem. Since physicals are scheduled ahead of time, you can think about what you want to ask the doctor.  How often should I get a physical? -- It depends on your age and health. In general, for people age 21 years and older:   If you are younger than 50 years, you might be able to get a physical every 3 years.   If you are 50 years or older, your doctor might recommend a physical every year.  If you have an ongoing health condition, like diabetes or high blood pressure, your doctor will probably want to see you more often.  What happens during a physical? -- In general, each visit will include:   Physical exam - The doctor or nurse will check your height, weight, heart rate, and blood pressure. They will also look at your eyes and ears. They will ask about how you are feeling and whether you have any symptoms that bother you.   Medicines - It's a good idea to bring a list of all the medicines you take to each doctor visit. Your doctor will talk to you about your medicines and answer any questions. Tell them if you are having any side effects that bother you. You " "should also tell them if you are having trouble paying for any of your medicines.   Habits and behaviors - This includes:   Your diet   Your exercise habits   Whether you smoke, drink alcohol, or use drugs   Whether you are sexually active   Whether you feel safe at home  Your doctor will talk to you about things you can do to improve your health and lower your risk of health problems. They will also offer help and support. For example, if you want to quit smoking, they can give you advice and might prescribe medicines. If you want to improve your diet or get more physical activity, they can help you with this, too.   Lab tests, if needed - The tests you get will depend on your age and situation. For example, your doctor might want to check your:   Cholesterol   Blood sugar   Iron level   Vaccines - The recommended vaccines will depend on your age, health, and what vaccines you already had. Vaccines are very important because they can prevent certain serious or deadly infections.   Discussion of screening - \"Screening\" means checking for diseases or other health problems before they cause symptoms. Your doctor can recommend screening based on your age, risk, and preferences. This might include tests to check for:   Cancer, such as breast, prostate, cervical, ovarian, colorectal, prostate, lung, or skin cancer   Sexually transmitted infections, such as chlamydia and gonorrhea   Mental health conditions like depression and anxiety  Your doctor will talk to you about the different types of screening tests. They can help you decide which screenings to have. They can also explain what the results might mean.   Answering questions - The physical is a good time to ask the doctor or nurse questions about your health. If needed, they can refer you to other doctors or specialists, too.  Adults older than 65 years often need other care, too. As you get older, your doctor will talk to you about:   How to prevent falling at " home   Hearing or vision tests   Memory testing   How to take your medicines safely   Making sure that you have the help and support you need at home  All topics are updated as new evidence becomes available and our peer review process is complete.  This topic retrieved from [employer] on: May 02, 2024.  Topic 762188 Version 1.0  Release: 32.4.3 - C32.122  © 2024 UpToDate, Inc. and/or its affiliates. All rights reserved.  Consumer Information Use and Disclaimer   Disclaimer: This generalized information is a limited summary of diagnosis, treatment, and/or medication information. It is not meant to be comprehensive and should be used as a tool to help the user understand and/or assess potential diagnostic and treatment options. It does NOT include all information about conditions, treatments, medications, side effects, or risks that may apply to a specific patient. It is not intended to be medical advice or a substitute for the medical advice, diagnosis, or treatment of a health care provider based on the health care provider's examination and assessment of a patient's specific and unique circumstances. Patients must speak with a health care provider for complete information about their health, medical questions, and treatment options, including any risks or benefits regarding use of medications. This information does not endorse any treatments or medications as safe, effective, or approved for treating a specific patient. UpToDate, Inc. and its affiliates disclaim any warranty or liability relating to this information or the use thereof.The use of this information is governed by the Terms of Use, available at https://www.[employer].uwer.com/en/know/clinical-effectiveness-terms. 2024© UpToDate, Inc. and its affiliates and/or licensors. All rights reserved.  Copyright   © 2024 UpToDate, Inc. and/or its affiliates. All rights reserved.

## 2025-02-20 NOTE — ASSESSMENT & PLAN NOTE
Orders:  •  RPR-Syphilis Screening (Total Syphilis IGG/IGM); Future  •  Chlamydia/GC amplified DNA by PCR; Future  Recent partner with HPV warts

## 2025-02-20 NOTE — ASSESSMENT & PLAN NOTE
Janice en Y surgery 13 years ago and lost 160 lbs and did well from the surgery and had completed at [facility]  Orders:  •  Comprehensive metabolic panel; Future  •  CBC and differential; Future  •  Iron Panel (Includes Ferritin, Iron Sat%, Iron, and TIBC); Future  •  Vitamin B12; Future

## 2025-02-20 NOTE — PROGRESS NOTES
Adult Annual Physical  Name: Jenifer Stephens      : 1970      MRN: [id number]  Encounter Provider: KEI Penn  Encounter Date: 2025   Encounter department: [facility] [address]    Assessment & Plan  Annual physical exam         Status post bariatric surgery  Janice en Y surgery 13 years ago and lost 160 lbs and did well from the surgery and had completed at [facility]  Orders:  •  Comprehensive metabolic panel; Future  •  CBC and differential; Future  •  Iron Panel (Includes Ferritin, Iron Sat%, Iron, and TIBC); Future  •  Vitamin B12; Future    B-complex deficiency  Patient has secondary to her surgery   Orders:  •  Vitamin B12; Future    Vitamin D deficiency  Vitamin D   Orders:  •  Vitamin D 25 hydroxy; Future    Hypercholesterolemia  HX of high cholesterol   Orders:  •  Lipid Panel with Direct LDL reflex; Future    Other iron deficiency anemia  Will update labs   Orders:  •  CBC and differential; Future  •  Iron Panel (Includes Ferritin, Iron Sat%, Iron, and TIBC); Future    Renal stones  Distant history of kidney stones   Orders:  •  Urine culture; Future  •  UA (URINE) with reflex to Scope; Future    Tobacco use disorder  Smoking 1/2 pack a day for many years        Family history of infection due to human papilloma virus (HPV)         Screening for diabetes mellitus    Orders:  •  Hemoglobin A1C; Future    Encounter for hepatitis C screening test for low risk patient    Orders:  •  Hepatitis C antibody; Future    Screening for HIV (human immunodeficiency virus)    Orders:  •  HIV 1/2 AG/AB w Reflex [facility] for 2 yr old and above; Future    Screening for STDs (sexually transmitted diseases)    Orders:  •  RPR-Syphilis Screening (Total Syphilis IGG/IGM); Future  •  Chlamydia/GC amplified DNA by PCR; Future  Recent partner with HPV warts   Encounter for screening mammogram for malignant neoplasm of breast    Orders:  •  Mammo screening bilateral w 3d and cad; Future    Screen for  colon cancer    Orders:  •  Ambulatory Referral to Gastroenterology; Future    Family history of melanoma    Orders:  •  Ambulatory Referral to Dermatology; Future    Immunizations and preventive care screenings were discussed with patient today. Appropriate education was printed on patient's after visit summary.    Counseling:  Injury prevention: discussed safety/seat belts, safety helmets, smoke detectors, carbon monoxide detectors, and smoking near bedding or upholstery.  Exercise: the importance of regular exercise/physical activity was discussed. Recommend exercise 3-5 times per week for at least 30 minutes.          History of Present Illness     Adult Annual Physical:  Patient presents for annual physical. Patient here today and reports that her partner found out that he has HPV warts and she is here today to have a check up. Mom passed away from melanoma. MGF with bile duct cancer and sister passed at 48 from liver cirrhosis.   Patient has not seen primary care in many years. Patient was in the hospital last year and was in for 5 days with illness. .     Diet and Physical Activity:  - Diet/Nutrition: well balanced diet. baratric diet  - Exercise: walking and 5-7 times a week on average.    Depression Screening:    - PHQ-9 Score: 0    General Health:  - Sleep: sleeps well.  - Hearing: normal hearing bilateral ears.  - Vision: goes for regular eye exams, wears glasses and most recent eye exam > 1 year ago.  - Dental: regular dental visits and brushes teeth twice daily.    /GYN Health:  - Follows with GYN: no.   - Menopause: postmenopausal.   - Contraception:. ablation history      Review of Systems   Constitutional:  Negative for activity change, appetite change, chills, diaphoresis, fatigue, fever and unexpected weight change.   HENT:  Negative for congestion, ear pain, hearing loss, postnasal drip, sinus pressure, sinus pain, sneezing, sore throat, trouble swallowing and voice change.    Eyes:  Negative  "for pain, redness and visual disturbance.   Respiratory:  Negative for cough and shortness of breath.    Cardiovascular:  Negative for chest pain, palpitations and leg swelling.   Gastrointestinal:  Negative for abdominal distention, abdominal pain, anal bleeding, blood in stool, constipation, diarrhea, nausea, rectal pain and vomiting.   Endocrine: Negative.    Genitourinary: Negative.    Musculoskeletal:  Negative for arthralgias, back pain, joint swelling, myalgias, neck pain and neck stiffness.   Skin: Negative.    Allergic/Immunologic: Negative for environmental allergies, food allergies and immunocompromised state.   Neurological:  Negative for dizziness, tremors, seizures, syncope, facial asymmetry, speech difficulty, weakness, light-headedness, numbness and headaches.   Hematological:  Negative for adenopathy. Does not bruise/bleed easily.   Psychiatric/Behavioral:  Negative for behavioral problems and dysphoric mood. The patient is not nervous/anxious.          Objective   /86 (BP Location: Right arm, Patient Position: Sitting)   Pulse 80   Temp 97.8 °F (36.6 °C) (Temporal)   Ht 5' 5\" (1.651 m)   Wt 72.1 kg (159 lb)   SpO2 98%   BMI 26.46 kg/m²     Physical Exam  Exam conducted with a chaperone present.   Constitutional:       General: She is not in acute distress.     Appearance: She is well-developed.   HENT:      Head: Normocephalic and atraumatic.   Eyes:      Pupils: Pupils are equal, round, and reactive to light.   Neck:      Thyroid: No thyromegaly.   Cardiovascular:      Rate and Rhythm: Normal rate and regular rhythm.      Heart sounds: Normal heart sounds. No murmur heard.  Pulmonary:      Effort: Pulmonary effort is normal. No respiratory distress.      Breath sounds: Normal breath sounds. No wheezing.   Abdominal:      General: Bowel sounds are normal.      Palpations: Abdomen is soft.      Hernia: There is no hernia in the left inguinal area or right inguinal area. "   Genitourinary:     Exam position: Supine.      Pubic Area: No rash.       Pawan stage (genital): 5.      Labia:         Right: No rash, tenderness or lesion.         Left: No rash, tenderness or lesion.       Urethra: No prolapse.      Vagina: Normal.      Cervix: Discharge present. No cervical motion tenderness, friability, erythema, cervical bleeding or eversion.      Uterus: Normal.       Adnexa: Right adnexa normal and left adnexa normal.      Rectum: Normal.      Comments: Think yellow/white discharge   Musculoskeletal:         General: Normal range of motion.      Cervical back: Normal range of motion.   Lymphadenopathy:      Lower Body: No right inguinal adenopathy. No left inguinal adenopathy.   Skin:     General: Skin is warm and dry.   Neurological:      Mental Status: She is alert and oriented to person, place, and time.

## 2025-02-20 NOTE — ASSESSMENT & PLAN NOTE
Will update labs   Orders:  •  CBC and differential; Future  •  Iron Panel (Includes Ferritin, Iron Sat%, Iron, and TIBC); Future

## 2025-02-20 NOTE — ASSESSMENT & PLAN NOTE
Distant history of kidney stones   Orders:  •  Urine culture; Future  •  UA (URINE) with reflex to Scope; Future

## 2025-02-21 ENCOUNTER — RESULTS FOLLOW-UP (OUTPATIENT)
Dept: FAMILY MEDICINE CLINIC | Facility: CLINIC | Age: 55
End: 2025-02-21

## 2025-02-21 DIAGNOSIS — B96.89 BACTERIAL VAGINOSIS: Primary | ICD-10-CM

## 2025-02-21 DIAGNOSIS — N76.0 BACTERIAL VAGINOSIS: Primary | ICD-10-CM

## 2025-02-21 LAB
CANDIDA RRNA VAG QL PROBE: NOT DETECTED
G VAGINALIS RRNA GENITAL QL PROBE: DETECTED
HPV HR 12 DNA CVX QL NAA+PROBE: NEGATIVE
HPV16 DNA CVX QL NAA+PROBE: NEGATIVE
HPV18 DNA CVX QL NAA+PROBE: POSITIVE
T VAGINALIS RRNA GENITAL QL PROBE: NOT DETECTED

## 2025-02-21 RX ORDER — METRONIDAZOLE 500 MG/1
500 TABLET ORAL EVERY 12 HOURS SCHEDULED
Qty: 14 TABLET | Refills: 0 | Status: SHIPPED | OUTPATIENT
Start: 2025-02-21 | End: 2025-02-28

## 2025-02-26 LAB
LAB AP GYN PRIMARY INTERPRETATION: NORMAL
Lab: NORMAL
PATH INTERP SPEC-IMP: NORMAL

## 2025-03-22 PROBLEM — Z11.59 ENCOUNTER FOR HEPATITIS C SCREENING TEST FOR LOW RISK PATIENT: Status: RESOLVED | Noted: 2025-02-20 | Resolved: 2025-03-22

## 2025-03-22 PROBLEM — Z12.11 SCREEN FOR COLON CANCER: Status: RESOLVED | Noted: 2025-02-20 | Resolved: 2025-03-22

## 2025-03-22 PROBLEM — Z11.3 SCREENING FOR STDS (SEXUALLY TRANSMITTED DISEASES): Status: RESOLVED | Noted: 2025-02-20 | Resolved: 2025-03-22

## 2025-03-22 PROBLEM — Z11.4 SCREENING FOR HIV (HUMAN IMMUNODEFICIENCY VIRUS): Status: RESOLVED | Noted: 2025-02-20 | Resolved: 2025-03-22

## 2025-03-22 PROBLEM — Z13.1 SCREENING FOR DIABETES MELLITUS: Status: RESOLVED | Noted: 2025-02-20 | Resolved: 2025-03-22
